# Patient Record
Sex: FEMALE | Race: WHITE | NOT HISPANIC OR LATINO | Employment: STUDENT | URBAN - METROPOLITAN AREA
[De-identification: names, ages, dates, MRNs, and addresses within clinical notes are randomized per-mention and may not be internally consistent; named-entity substitution may affect disease eponyms.]

---
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
## 2019-04-24 ENCOUNTER — OFFICE VISIT (OUTPATIENT)
Dept: OBGYN CLINIC | Facility: MEDICAL CENTER | Age: 19
End: 2019-04-24
Payer: COMMERCIAL

## 2019-04-24 VITALS
DIASTOLIC BLOOD PRESSURE: 68 MMHG | WEIGHT: 189.9 LBS | HEIGHT: 67 IN | BODY MASS INDEX: 29.81 KG/M2 | SYSTOLIC BLOOD PRESSURE: 110 MMHG

## 2019-04-24 DIAGNOSIS — Z30.011 VISIT FOR ORAL CONTRACEPTIVE PRESCRIPTION: Primary | ICD-10-CM

## 2019-04-24 DIAGNOSIS — Z23 NEED FOR HPV VACCINATION: ICD-10-CM

## 2019-04-24 DIAGNOSIS — Z30.011 ENCOUNTER FOR BCP (BIRTH CONTROL PILLS) INITIAL PRESCRIPTION: ICD-10-CM

## 2019-04-24 PROCEDURE — 99214 OFFICE O/P EST MOD 30 MIN: CPT | Performed by: OBSTETRICS & GYNECOLOGY

## 2019-04-24 PROCEDURE — 96372 THER/PROPH/DIAG INJ SC/IM: CPT | Performed by: OBSTETRICS & GYNECOLOGY

## 2019-04-24 PROCEDURE — 90471 IMMUNIZATION ADMIN: CPT | Performed by: OBSTETRICS & GYNECOLOGY

## 2019-04-24 PROCEDURE — 90651 9VHPV VACCINE 2/3 DOSE IM: CPT | Performed by: OBSTETRICS & GYNECOLOGY

## 2019-04-24 RX ORDER — NORGESTIMATE AND ETHINYL ESTRADIOL 0.25-0.035
1 KIT ORAL DAILY
Qty: 30 TABLET | Refills: 11 | Status: SHIPPED | OUTPATIENT
Start: 2019-04-24 | End: 2020-04-26

## 2019-04-24 RX ORDER — LORATADINE 10 MG/1
TABLET ORAL DAILY
COMMUNITY
End: 2021-02-08 | Stop reason: ALTCHOICE

## 2019-05-20 ENCOUNTER — OFFICE VISIT (OUTPATIENT)
Dept: OBGYN CLINIC | Facility: MEDICAL CENTER | Age: 19
End: 2019-05-20
Payer: COMMERCIAL

## 2019-05-20 VITALS — WEIGHT: 187.2 LBS | DIASTOLIC BLOOD PRESSURE: 60 MMHG | BODY MASS INDEX: 29.32 KG/M2 | SYSTOLIC BLOOD PRESSURE: 104 MMHG

## 2019-05-20 DIAGNOSIS — N64.4 PAIN OF BOTH BREASTS: Primary | ICD-10-CM

## 2019-05-20 PROCEDURE — 99214 OFFICE O/P EST MOD 30 MIN: CPT | Performed by: NURSE PRACTITIONER

## 2019-07-26 ENCOUNTER — DOCTOR'S OFFICE (OUTPATIENT)
Dept: URBAN - METROPOLITAN AREA CLINIC 136 | Facility: CLINIC | Age: 19
Setting detail: OPHTHALMOLOGY
End: 2019-07-26
Payer: COMMERCIAL

## 2019-07-26 DIAGNOSIS — H10.413: ICD-10-CM

## 2019-07-26 PROCEDURE — 99202 OFFICE O/P NEW SF 15 MIN: CPT | Performed by: OPTOMETRIST

## 2019-07-26 ASSESSMENT — REFRACTION_MANIFEST
OD_SPHERE: PLANO
OS_VA1: 20/
OS_VA1: 20/20
OS_VA2: 20/
OU_VA: 20/20
OD_VA1: 20/
OU_VA: 20/
OS_VA3: 20/
OS_VA2: 20/
OD_VA3: 20/
OD_VA1: 20/20
OD_CYLINDER: SPH
OD_VA3: 20/
OD_VA2: 20/
OS_CYLINDER: SPH
OS_SPHERE: PLANO
OD_VA2: 20/
OS_VA3: 20/

## 2019-07-26 ASSESSMENT — REFRACTION_CURRENTRX
OS_OVR_VA: 20/
OS_OVR_VA: 20/
OD_OVR_VA: 20/
OS_OVR_VA: 20/

## 2019-07-26 ASSESSMENT — KERATOMETRY
OS_AXISANGLE_DEGREES: 96
OD_K1POWER_DIOPTERS: 44.50
OD_K2POWER_DIOPTERS: 46.25
OS_K2POWER_DIOPTERS: 46.25
OS_K1POWER_DIOPTERS: 44.75
OD_AXISANGLE_DEGREES: 91

## 2019-07-26 ASSESSMENT — REFRACTION_AUTOREFRACTION
OD_SPHERE: +0.50
OD_AXIS: 159
OS_CYLINDER: -0.25
OS_AXIS: 84
OD_CYLINDER: -0.75
OS_SPHERE: PLANO

## 2019-07-26 ASSESSMENT — CONFRONTATIONAL VISUAL FIELD TEST (CVF)
OD_FINDINGS: FULL
OS_FINDINGS: FULL

## 2019-07-26 ASSESSMENT — SPHEQUIV_DERIVED: OD_SPHEQUIV: 0.125

## 2019-07-26 ASSESSMENT — VISUAL ACUITY
OS_BCVA: 20/20-1
OD_BCVA: 20/20

## 2019-07-26 ASSESSMENT — AXIALLENGTH_DERIVED: OD_AL: 22.8769

## 2019-09-25 ENCOUNTER — OFFICE VISIT (OUTPATIENT)
Dept: OBGYN CLINIC | Facility: MEDICAL CENTER | Age: 19
End: 2019-09-25
Payer: COMMERCIAL

## 2019-09-25 VITALS — DIASTOLIC BLOOD PRESSURE: 74 MMHG | SYSTOLIC BLOOD PRESSURE: 102 MMHG | WEIGHT: 182.9 LBS | BODY MASS INDEX: 28.65 KG/M2

## 2019-09-25 DIAGNOSIS — B37.3 CANDIDIASIS OF FEMALE GENITALIA: Primary | ICD-10-CM

## 2019-09-25 DIAGNOSIS — N89.8 VAGINAL ITCHING: ICD-10-CM

## 2019-09-25 PROCEDURE — 99213 OFFICE O/P EST LOW 20 MIN: CPT | Performed by: NURSE PRACTITIONER

## 2019-09-25 RX ORDER — FLUCONAZOLE 200 MG/1
TABLET ORAL
Qty: 2 TABLET | Refills: 1 | Status: SHIPPED | OUTPATIENT
Start: 2019-09-25 | End: 2019-09-29

## 2019-09-25 NOTE — PROGRESS NOTES
A/P:  22 y o  yo female with:  Diagnoses and all orders for this visit:    Candidiasis of female genitalia  -     fluconazole (DIFLUCAN) 200 mg tablet; Take one tablet now and repeat in 4 days if needed  Vaginal itching        1  Wet prep was obtained  Cultures for gonorrhea and chlamydia were not collected  Affirm was not obtained  2   Will contact pt with results  3  Patient was treated with fluconazole  HISTORY OF PRESENT ILLNESS:  Ms Aiyana Mohamud is a 22 y o  yo  female who presents for vaginal discharge  She describes the discharge as thick, white and non-fishy odor  Her symptoms started 3 days ago  show no change   States sxs not related to sex  Alleviating factors: none  Aggravating factors: scratching    ROS:   She denies hematuria, dysuria, constipation, diarrhea, fever, chills, nausea or emesis  No past medical history on file  No past medical history on file      Social History     Socioeconomic History    Marital status: Single     Spouse name: Not on file    Number of children: Not on file    Years of education: Not on file    Highest education level: Not on file   Occupational History    Not on file   Social Needs    Financial resource strain: Not on file    Food insecurity:     Worry: Not on file     Inability: Not on file    Transportation needs:     Medical: Not on file     Non-medical: Not on file   Tobacco Use    Smoking status: Never Smoker    Smokeless tobacco: Never Used   Substance and Sexual Activity    Alcohol use: No    Drug use: No    Sexual activity: Yes     Partners: Male     Birth control/protection: OCP   Lifestyle    Physical activity:     Days per week: Not on file     Minutes per session: Not on file    Stress: Not on file   Relationships    Social connections:     Talks on phone: Not on file     Gets together: Not on file     Attends [employer] service: Not on file     Active member of club or organization: Not on file Attends meetings of clubs or organizations: Not on file     Relationship status: Not on file    Intimate partner violence:     Fear of current or ex partner: Not on file     Emotionally abused: Not on file     Physically abused: Not on file     Forced sexual activity: Not on file   Other Topics Concern    Not on file   Social History Narrative    Not on file       /74   Wt 83 kg (182 lb 14 4 oz)   BMI 28 65 kg/m²     GEN: The patient was alert and oriented x3, pleasant well-appearing female in no acute distress  Pelvic: Normal appearing external female genitalia, normal vaginal epithelium, normalappearing cervix  positive discharge noted        Wet Prep: positive hyphae

## 2019-10-03 ENCOUNTER — TELEPHONE (OUTPATIENT)
Dept: OBGYN CLINIC | Facility: MEDICAL CENTER | Age: 19
End: 2019-10-03

## 2019-10-03 DIAGNOSIS — B96.89 BV (BACTERIAL VAGINOSIS): Primary | ICD-10-CM

## 2019-10-03 DIAGNOSIS — N76.0 BV (BACTERIAL VAGINOSIS): Primary | ICD-10-CM

## 2019-10-03 RX ORDER — METRONIDAZOLE 500 MG/1
500 TABLET ORAL EVERY 12 HOURS SCHEDULED
Qty: 14 TABLET | Refills: 0 | Status: SHIPPED | OUTPATIENT
Start: 2019-10-03 | End: 2019-10-10

## 2019-10-03 NOTE — TELEPHONE ENCOUNTER
Pt was recently seen by Zo Aleman and treated for a yeast infection  She is scheduled on 10/11 for a follow up because she feels that the infection has gotten worse  She would like to know if something stronger could be called in prior to her coming in because she is in discomfort  Thank you!

## 2019-10-09 ENCOUNTER — OFFICE VISIT (OUTPATIENT)
Dept: OBGYN CLINIC | Facility: MEDICAL CENTER | Age: 19
End: 2019-10-09
Payer: COMMERCIAL

## 2019-10-09 VITALS — WEIGHT: 184 LBS | SYSTOLIC BLOOD PRESSURE: 110 MMHG | DIASTOLIC BLOOD PRESSURE: 64 MMHG | BODY MASS INDEX: 28.82 KG/M2

## 2019-10-09 DIAGNOSIS — R39.9 UTI SYMPTOMS: Primary | ICD-10-CM

## 2019-10-09 DIAGNOSIS — Z11.3 SCREENING EXAMINATION FOR STD (SEXUALLY TRANSMITTED DISEASE): ICD-10-CM

## 2019-10-09 DIAGNOSIS — N89.8 VAGINAL DISCHARGE: ICD-10-CM

## 2019-10-09 DIAGNOSIS — N90.89 VULVAR LESION: ICD-10-CM

## 2019-10-09 LAB
SL AMB  POCT GLUCOSE, UA: NORMAL
SL AMB LEUKOCYTE ESTERASE,UA: NORMAL
SL AMB POCT BILIRUBIN,UA: 2
SL AMB POCT BLOOD,UA: NORMAL
SL AMB POCT CLARITY,UA: NORMAL
SL AMB POCT COLOR,UA: YELLOW
SL AMB POCT KETONES,UA: NORMAL
SL AMB POCT NITRITE,UA: NEGATIVE
SL AMB POCT PH,UA: 6.5
SL AMB POCT SPECIFIC GRAVITY,UA: 1.02
SL AMB POCT URINE PROTEIN: NORMAL
SL AMB POCT UROBILINOGEN: 0.2

## 2019-10-09 PROCEDURE — 99214 OFFICE O/P EST MOD 30 MIN: CPT | Performed by: NURSE PRACTITIONER

## 2019-10-09 PROCEDURE — 81002 URINALYSIS NONAUTO W/O SCOPE: CPT | Performed by: NURSE PRACTITIONER

## 2019-10-09 NOTE — PROGRESS NOTES
Assessment Diagnoses and all orders for this visit:    UTI symptoms  -     Urine culture  -     POCT urine dip    Screening examination for STD (sexually transmitted disease)  -     Chlamydia/GC amplified DNA by PCR    Vulvar lesion  -     Herpes Simplex Virus Culture with Typing    Vaginal discharge  -     VAGINOSIS DNA PROBE (AFFIRM)         Plan: Will await results of testing and call her with results and plan  Advised no sex until results are back  Counseled on importance of safe sex with condom use all the time  25 minutes was spent with pt of which >50% was counseling and coordination of care  Vimal Kennedy is a 22 y o  female here for a problem visit  Seen by me recently with same c/o 25 minutes was spent with pt of which >50% was counseling and coordination of care  Patient is complaining of vaginal itching   Pt Felt like sxs resolved after the 2nd dose of fluconazole but returned after she had sex again  Sx's started 1 weeks ago  Patient reports that sx's are not related to her menses     In monogamous relationship with partner of 2 months  Wants piece of mind std testing, asymptomatic  Denies fever, chills, nausea /vomiting  Last night had some pain with urination  No hx of pyelonephritis  There is no problem list on file for this patient  Gynecologic History  Patient's last menstrual period was 09/19/2019  The current method of family planning is OCP (estrogen/progesterone)  No past medical history on file  No past surgical history on file    Family History   Problem Relation Age of Onset    No Known Problems Mother     Ovarian cancer Family     No Known Problems Father      Social History     Socioeconomic History    Marital status: Single     Spouse name: Not on file    Number of children: Not on file    Years of education: Not on file    Highest education level: Not on file   Occupational History    Not on file   Social Needs    Financial resource strain: Not on file    Food insecurity:     Worry: Not on file     Inability: Not on file    Transportation needs:     Medical: Not on file     Non-medical: Not on file   Tobacco Use    Smoking status: Never Smoker    Smokeless tobacco: Never Used   Substance and Sexual Activity    Alcohol use: No    Drug use: No    Sexual activity: Yes     Partners: Male     Birth control/protection: OCP   Lifestyle    Physical activity:     Days per week: Not on file     Minutes per session: Not on file    Stress: Not on file   Relationships    Social connections:     Talks on phone: Not on file     Gets together: Not on file     Attends Buddhist service: Not on file     Active member of club or organization: Not on file     Attends meetings of clubs or organizations: Not on file     Relationship status: Not on file    Intimate partner violence:     Fear of current or ex partner: Not on file     Emotionally abused: Not on file     Physically abused: Not on file     Forced sexual activity: Not on file   Other Topics Concern    Not on file   Social History Narrative    Not on file     Allergies   Allergen Reactions    Cephalosporins Hives    Erythromycin Base Hives    Amoxicillin Hives and Rash    Erythromycin Rash    Sulfamethoxazole-Trimethoprim Rash       Current Outpatient Medications:     loratadine (CLARITIN) 10 mg tablet, Daily , Disp: , Rfl:     metroNIDAZOLE (FLAGYL) 500 mg tablet, Take 1 tablet (500 mg total) by mouth every 12 (twelve) hours for 7 days, Disp: 14 tablet, Rfl: 0    norgestimate-ethinyl estradiol (ORTHO-CYCLEN) 0 25-35 MG-MCG per tablet, Take 1 tablet by mouth daily, Disp: 30 tablet, Rfl: 11    Review of Systems  Constitutional :no fever, feels well, no tiredness, no recent weight gain or loss  ENT: no ear ache, no loss of hearing, no nosebleeds or nasal discharge, no sore throat or hoarseness    Cardiovascular: no complaints of slow or fast heart beat, no chest pain, no palpitations, no leg claudication or lower extremity edema  Respiratory: no complaints of shortness of shortness of breath, no HADDAD  Breasts:no complaints of breast pain, breast lump, or nipple discharge  Gastrointestinal: no complaints of abdominal pain, constipation, nausea, vomiting, or diarrhea or bloody stools  Genitourinary : no complaints of dysuria, incontinence, pelvic pain, no dysmenorrhea, vaginal discharge or abnormal vaginal bleeding and as noted in HPI  Musculoskeletal: no complaints of arthralgia, no myalgia, no joint swelling or stiffness, no limb pain or swelling  Integumentary: no complaints of skin rash or lesion, itching or dry skin  Neurological: no complaints of headache, no confusion, no numbness or tingling, no dizziness or fainting     Objective     /64   Wt 83 5 kg (184 lb)   LMP 09/19/2019   BMI 28 82 kg/m²     General:   appears stated age, cooperative, alert normal mood and affect   Vulva: normal   Vagina: Small ulcerated lesion at introitus, culture sent for HSV  small amount of white d/c in vagina   Urethra: normal   Lymphatic palpation of lymph nodes in neck, axilla, groin and/or other locations: no lymphadenopathy or masses noted   Skin normal skin turgor and no rashes     Psychiatric orientation to person, place, and time: normal  mood and affect: normal

## 2019-10-10 LAB
C TRACH RRNA SPEC QL NAA+PROBE: NOT DETECTED
N GONORRHOEA RRNA SPEC QL NAA+PROBE: NOT DETECTED

## 2019-10-14 NOTE — RESULT ENCOUNTER NOTE
Pl advise normal lab results  Notify that herpes culture was neg, all her other testing was neg  Jessica called her with those results

## 2019-12-03 ENCOUNTER — OFFICE VISIT (OUTPATIENT)
Dept: OBGYN CLINIC | Facility: MEDICAL CENTER | Age: 19
End: 2019-12-03
Payer: COMMERCIAL

## 2019-12-03 VITALS — DIASTOLIC BLOOD PRESSURE: 70 MMHG | BODY MASS INDEX: 28.98 KG/M2 | WEIGHT: 185 LBS | SYSTOLIC BLOOD PRESSURE: 104 MMHG

## 2019-12-03 DIAGNOSIS — R30.0 BURNING WITH URINATION: ICD-10-CM

## 2019-12-03 DIAGNOSIS — N94.9 VAGINAL BURNING: Primary | ICD-10-CM

## 2019-12-03 LAB
SL AMB  POCT GLUCOSE, UA: NORMAL
SL AMB LEUKOCYTE ESTERASE,UA: NORMAL
SL AMB POCT BILIRUBIN,UA: NORMAL
SL AMB POCT BLOOD,UA: NORMAL
SL AMB POCT CLARITY,UA: NORMAL
SL AMB POCT COLOR,UA: YELLOW
SL AMB POCT KETONES,UA: NORMAL
SL AMB POCT NITRITE,UA: NORMAL
SL AMB POCT PH,UA: 5
SL AMB POCT SPECIFIC GRAVITY,UA: 1.03
SL AMB POCT URINE PROTEIN: NORMAL
SL AMB POCT UROBILINOGEN: 0.2

## 2019-12-03 PROCEDURE — 81002 URINALYSIS NONAUTO W/O SCOPE: CPT | Performed by: NURSE PRACTITIONER

## 2019-12-03 PROCEDURE — 99214 OFFICE O/P EST MOD 30 MIN: CPT | Performed by: NURSE PRACTITIONER

## 2019-12-03 RX ORDER — FLUCONAZOLE 200 MG/1
TABLET ORAL
Qty: 2 TABLET | Refills: 0 | Status: SHIPPED | OUTPATIENT
Start: 2019-12-03 | End: 2019-12-07

## 2019-12-03 NOTE — PROGRESS NOTES
A/P:  20 y o  yo female with:  Diagnoses and all orders for this visit:    Vaginal burning  -     fluconazole (DIFLUCAN) 200 mg tablet; Take one tablet now and repeat in 4 days if needed  Burning with urination  -     POCT urine dip  -     Urine culture        1  Wet prep was obtained  Cultures for gonorrhea and chlamydia were not collected  Affirm was not obtained  2   Will contact pt with results  3  Patient was treated with fluconazole  4  Urine dip in office was neg, cc culture sent to lab  5  Safe sex practices and vaginal hygeine d/w her and all her questions were answered  6 25 minutes was spent with pt of which >50% was counseling and coordination of care  HISTORY OF PRESENT ILLNESS:  Ms Aiyana Bustos is a 20 y o  yo [place] female who presents for vaginal discharge and irritation     She describes the discharge as white  Her symptoms started 1 weeks ago  show no change  States in monog  Relationship for 2 months, always uses condoms  Has occa  Abd  Pain  Denies f/c/n/v  No dyspareunia  Alleviating factors: none  Aggravating factors: burns when urine hits the skin    ROS:   She denies hematuria, dysuria, constipation, diarrhea, fever, chills, nausea or emesis  No past medical history on file  No past medical history on file      Social History     Socioeconomic History    Marital status: Single     Spouse name: Not on file    Number of children: Not on file    Years of education: Not on file    Highest education level: Not on file   Occupational History    Not on file   Social Needs    Financial resource strain: Not on file    Food insecurity:     Worry: Not on file     Inability: Not on file    Transportation needs:     Medical: Not on file     Non-medical: Not on file   Tobacco Use    Smoking status: Never Smoker    Smokeless tobacco: Never Used   Substance and Sexual Activity    Alcohol use: No    Drug use: No    Sexual activity: Yes     Partners: Male     Birth control/protection: OCP   Lifestyle    Physical activity:     Days per week: Not on file     Minutes per session: Not on file    Stress: Not on file   Relationships    Social connections:     Talks on phone: Not on file     Gets together: Not on file     Attends Quaker service: Not on file     Active member of club or organization: Not on file     Attends meetings of clubs or organizations: Not on file     Relationship status: Not on file    Intimate partner violence:     Fear of current or ex partner: Not on file     Emotionally abused: Not on file     Physically abused: Not on file     Forced sexual activity: Not on file   Other Topics Concern    Not on file   Social History Narrative    Not on file       /70   Wt 83 9 kg (185 lb)   LMP 11/09/2019   BMI 28 98 kg/m²     GEN: The patient was alert and oriented x3, pleasant well-appearing female in no acute distress  Pelvic: Normal appearing external female genitalia, normal vaginal epithelium, normalappearing cervix  positive discharge noted        Wet Prep: positive hyphae

## 2019-12-17 ENCOUNTER — TELEPHONE (OUTPATIENT)
Dept: OBGYN CLINIC | Facility: MEDICAL CENTER | Age: 19
End: 2019-12-17

## 2019-12-17 DIAGNOSIS — N94.9 VAGINAL BURNING: Primary | ICD-10-CM

## 2019-12-17 RX ORDER — FLUCONAZOLE 200 MG/1
TABLET ORAL
Qty: 2 TABLET | Refills: 0 | Status: SHIPPED | OUTPATIENT
Start: 2019-12-17 | End: 2019-12-21

## 2019-12-17 NOTE — TELEPHONE ENCOUNTER
Treated 2 weeks ago with diflucan  Had improvement now symptoms have returned  Requesting treatment again  Describing vaginal itching and burning with thick white discharge    Advised patient that if infection re-occurs, she would need to schedule appointment

## 2019-12-17 NOTE — TELEPHONE ENCOUNTER
The patient left a message that the patient was seen about a week ago  The patient still has a yeast infection  Can something stronger be called into the patients pharmacy

## 2020-01-28 DIAGNOSIS — J11.1 INFLUENZA: ICD-10-CM

## 2020-01-28 DIAGNOSIS — A60.00 PRIMARY GENITAL HERPES SIMPLEX INFECTION: Primary | ICD-10-CM

## 2020-01-28 RX ORDER — OSELTAMIVIR PHOSPHATE 75 MG/1
75 CAPSULE ORAL 2 TIMES DAILY
Qty: 10 CAPSULE | Refills: 0 | Status: SHIPPED | OUTPATIENT
Start: 2020-01-28 | End: 2020-02-02

## 2020-01-28 NOTE — PROGRESS NOTES
Call placed to patient regarding appointment today  Patient has appointment scheduled for STD testing and pregnancy test, but seen in [address] [place] [address] [address] 1/27/20 for same  Also flu+  Mother also on phone for call  Reviewed results with patient; has not been informed of any results  Reviewed first that she is influenza A+  She notes symptoms started just under 2d ago; advised on tamiflu, which she accepts  Advised patient on +HSV1 swab  Patient notes she has only recently been sexually active with 2 partners, and "they're both negative " Advised on methods for testing HSV - swab vs serum  Swab will only be + with active outbreak; serum will remain +  Advised patient on valtrex therapy for acute outbreak, natural hx of HSV, lifelong nature of infection, and that she is capable of transmitting to others while in prodrome or with active infection  Patient and her mother had many questions regarding how to determine where she got this; I advised on the limitations of this search, as this is an infection that can lie dormant for a long time and she may have acquired it during the prodrome period of a partner and not noticed any lesions  I advised on not using suppression right now  Advised to determine her outbreak pattern, and often times this is infrequent and may be managed with outbreak directed therapy  If frequent, can discuss daily medication  Advised GC still pending; asked to call office Thurs/Fri for results, as they will come to her ordering provider and not our direct results  Advised on recommendation to screen for HIV/Hep B&C/RPR, which she can do at any [facility] when well  Slips placed in Epic  Patient and her mother had all questions answered to their satisfaction  Appointment cancelled and advised to reschedule if still requiring evaluation later      Suly Laws MD  01/28/20  10:50 AM

## 2020-02-04 ENCOUNTER — TELEPHONE (OUTPATIENT)
Dept: OBGYN CLINIC | Facility: MEDICAL CENTER | Age: 20
End: 2020-02-04

## 2020-02-04 NOTE — TELEPHONE ENCOUNTER
Pt has questions in regards on medication treatment she was put on  She stated she was put doxycycline and other ones she didn't know the name of  States there is changes and would like to discuss with you

## 2020-02-04 NOTE — TELEPHONE ENCOUNTER
Call returned to patient  She reports she finished Valtrex and sores have resolved, however she has a new itching on her clitoris  Feels swollen, but doesn't have same pain or ulceration as her other sores  Discussed difficult to say if this is a herpes lesion or not; given other resolution, likely just irritated from recent infection  Advised barrier ointment with vaseline and symptomatic care  Also advised patient GC testing with [facility] seems to not have been run  Advised her to call [facility] to inquire about this  If not completed, we can do the testing for her at her next visit  She notes she wants to schedule an appointment for follow up  Directed to  staff for scheduling

## 2020-02-11 ENCOUNTER — OFFICE VISIT (OUTPATIENT)
Dept: OBGYN CLINIC | Facility: MEDICAL CENTER | Age: 20
End: 2020-02-11
Payer: COMMERCIAL

## 2020-02-11 VITALS
SYSTOLIC BLOOD PRESSURE: 100 MMHG | HEIGHT: 66 IN | BODY MASS INDEX: 29.25 KG/M2 | DIASTOLIC BLOOD PRESSURE: 70 MMHG | WEIGHT: 182 LBS

## 2020-02-11 DIAGNOSIS — F32.0 CURRENT MILD EPISODE OF MAJOR DEPRESSIVE DISORDER, UNSPECIFIED WHETHER RECURRENT (HCC): ICD-10-CM

## 2020-02-11 DIAGNOSIS — A60.04 HERPES SIMPLEX VULVOVAGINITIS: Primary | ICD-10-CM

## 2020-02-11 PROCEDURE — 99214 OFFICE O/P EST MOD 30 MIN: CPT | Performed by: OBSTETRICS & GYNECOLOGY

## 2020-02-11 RX ORDER — SERTRALINE HYDROCHLORIDE 25 MG/1
25 TABLET, FILM COATED ORAL DAILY
Qty: 30 TABLET | Refills: 6 | Status: SHIPPED | OUTPATIENT
Start: 2020-02-11 | End: 2021-02-08 | Stop reason: ALTCHOICE

## 2020-02-11 RX ORDER — VALACYCLOVIR HYDROCHLORIDE 500 MG/1
500 TABLET, FILM COATED ORAL DAILY
Qty: 30 TABLET | Refills: 11 | Status: SHIPPED | OUTPATIENT
Start: 2020-02-11 | End: 2020-03-09 | Stop reason: SDUPTHER

## 2020-02-12 LAB
HBV SURFACE AG SERPL QL IA: NORMAL
HCV AB S/CO SERPL IA: 0.03
HCV AB SERPL QL IA: NORMAL
HIV 1+2 AB+HIV1 P24 AG SERPL QL IA: NORMAL
RPR SER QL: NORMAL

## 2020-02-28 ENCOUNTER — TELEPHONE (OUTPATIENT)
Dept: OBGYN CLINIC | Facility: MEDICAL CENTER | Age: 20
End: 2020-02-28

## 2020-02-28 NOTE — TELEPHONE ENCOUNTER
The patient has questions about the birth control that the patient was put on and would like a call back at [phone]

## 2020-03-02 ENCOUNTER — TELEPHONE (OUTPATIENT)
Dept: OBGYN CLINIC | Facility: MEDICAL CENTER | Age: 20
End: 2020-03-02

## 2020-03-02 NOTE — TELEPHONE ENCOUNTER
Pt had spotting 10 days before period  States she never spots before her period  Pt recently started taking valtrex  Pt wants to know if spotting is a side affect of the medication

## 2020-03-09 DIAGNOSIS — A60.04 HERPES SIMPLEX VULVOVAGINITIS: ICD-10-CM

## 2020-03-09 RX ORDER — VALACYCLOVIR HYDROCHLORIDE 500 MG/1
500 TABLET, FILM COATED ORAL DAILY
Qty: 30 TABLET | Refills: 11 | Status: SHIPPED | OUTPATIENT
Start: 2020-03-09 | End: 2020-07-07 | Stop reason: SDUPTHER

## 2020-04-26 DIAGNOSIS — Z30.011 ENCOUNTER FOR BCP (BIRTH CONTROL PILLS) INITIAL PRESCRIPTION: ICD-10-CM

## 2020-05-14 ENCOUNTER — TELEPHONE (OUTPATIENT)
Dept: OBGYN CLINIC | Facility: MEDICAL CENTER | Age: 20
End: 2020-05-14

## 2020-07-07 DIAGNOSIS — A60.04 HERPES SIMPLEX VULVOVAGINITIS: ICD-10-CM

## 2020-07-08 RX ORDER — VALACYCLOVIR HYDROCHLORIDE 500 MG/1
500 TABLET, FILM COATED ORAL DAILY
Qty: 30 TABLET | Refills: 11 | Status: SHIPPED | OUTPATIENT
Start: 2020-07-08 | End: 2021-03-10 | Stop reason: SDUPTHER

## 2020-09-23 ENCOUNTER — OFFICE VISIT (OUTPATIENT)
Dept: OBGYN CLINIC | Facility: MEDICAL CENTER | Age: 20
End: 2020-09-23
Payer: COMMERCIAL

## 2020-09-23 VITALS
TEMPERATURE: 97.1 F | SYSTOLIC BLOOD PRESSURE: 120 MMHG | DIASTOLIC BLOOD PRESSURE: 60 MMHG | BODY MASS INDEX: 24.75 KG/M2 | WEIGHT: 154 LBS | HEIGHT: 66 IN

## 2020-09-23 DIAGNOSIS — N89.8 VAGINAL DISCHARGE: ICD-10-CM

## 2020-09-23 DIAGNOSIS — N90.89 SWELLING OF VULVA: Primary | ICD-10-CM

## 2020-09-23 DIAGNOSIS — Z86.19 HISTORY OF POSITIVE PCR FOR HERPES SIMPLEX VIRUS TYPE 1 (HSV-1) DNA: ICD-10-CM

## 2020-09-23 PROCEDURE — 99214 OFFICE O/P EST MOD 30 MIN: CPT | Performed by: NURSE PRACTITIONER

## 2020-09-23 NOTE — PROGRESS NOTES
A/P:  20 y o  yo female with:  Diagnoses and all orders for this visit:    Swelling of vulva    Vaginal discharge    History of positive PCR for herpes simplex virus type 1 (HSV-1) DNA        1  Wet prep was obtained  Cultures for gonorrhea and chlamydia were not collected  Affirm was not obtained  2   Will contact pt with results  3  Patient was not treated   4 pt reassured of normal exam  Without infection  5 discussed episodic vs suppressive therapy has script, is leaning towards suppression  discussed with her how to tell partner about hsv diagosis  6  rec she stay off sites that are giving her inacurate info and call if has questions  7  25 minutes was spent with pt of which >50 was counseling and coordination of care  HISTORY OF PRESENT ILLNESS:  Ms Aiyana Durbin is a 20 y o  yo  female who presents with numerous concerns  States she started a very low calorie vegan type diet and detox  and lost 40# in 3 months  She was starting to feel week so recently stopped it and is eating regular diet now  States mainly did b/c it was suppose to cure hsv, which she thinks it has, is constantly researching hsv, and thinks she needs an us to evaluate her and also she has not been sexually active since [facility]  Menses are a little irregular now, but declines upt  She wants me to check her vulva swelling , and vaginal d/c  No odor, or itching  describes the discharge as white and clear  Her symptoms started 1 weeks ago  show no change    Alleviating factors: none  Aggravating factors: none  ROS:   She denies hematuria, dysuria, constipation, diarrhea, fever, chills, nausea or emesis  No past medical history on file  No past medical history on file      Social History     Socioeconomic History    Marital status: Single     Spouse name: Not on file    Number of children: Not on file    Years of education: Not on file    Highest education level: Not on file   Occupational History    Not on file   Social Needs    Financial resource strain: Not on file    Food insecurity     Worry: Not on file     Inability: Not on file    Transportation needs     Medical: Not on file     Non-medical: Not on file   Tobacco Use    Smoking status: Never Smoker    Smokeless tobacco: Never Used   Substance and Sexual Activity    Alcohol use: No    Drug use: No    Sexual activity: Not Currently     Partners: Male     Birth control/protection: None   Lifestyle    Physical activity     Days per week: Not on file     Minutes per session: Not on file    Stress: Not on file   Relationships    Social connections     Talks on phone: Not on file     Gets together: Not on file     Attends Latter day service: Not on file     Active member of club or organization: Not on file     Attends meetings of clubs or organizations: Not on file     Relationship status: Not on file    Intimate partner violence     Fear of current or ex partner: Not on file     Emotionally abused: Not on file     Physically abused: Not on file     Forced sexual activity: Not on file   Other Topics Concern    Not on file   Social History Narrative    Not on file       /60   Temp (!) 97 1 °F (36 2 °C) (Temporal)   Ht 5' 6" (1 676 m)   Wt 69 9 kg (154 lb)   LMP 08/26/2020 (Approximate)   BMI 24 86 kg/m²     GEN: The patient was alert and oriented x3, pleasant well-appearing female in no acute distress  Pelvic: Normal appearing external female genitalia, no swelling of vulva or vagina  Pt reassured she does not have a bartholins cyst  , normal vaginal epithelium, normalappearing cervix  Small amount of white discharge noted        Wet Prep: negative for infection

## 2020-10-28 ENCOUNTER — TELEPHONE (OUTPATIENT)
Dept: OBGYN CLINIC | Facility: MEDICAL CENTER | Age: 20
End: 2020-10-28

## 2020-11-18 ENCOUNTER — OFFICE VISIT (OUTPATIENT)
Dept: OBGYN CLINIC | Facility: MEDICAL CENTER | Age: 20
End: 2020-11-18
Payer: COMMERCIAL

## 2020-11-18 VITALS
BODY MASS INDEX: 26.19 KG/M2 | DIASTOLIC BLOOD PRESSURE: 90 MMHG | HEIGHT: 67 IN | TEMPERATURE: 89.4 F | SYSTOLIC BLOOD PRESSURE: 106 MMHG | WEIGHT: 166.9 LBS

## 2020-11-18 DIAGNOSIS — L65.0 TELOGEN EFFLUVIUM: Primary | ICD-10-CM

## 2020-11-18 PROBLEM — B00.9 HSV-1 INFECTION: Status: ACTIVE | Noted: 2020-11-18

## 2020-11-18 PROCEDURE — 99213 OFFICE O/P EST LOW 20 MIN: CPT | Performed by: OBSTETRICS & GYNECOLOGY

## 2020-11-19 LAB
FSH SERPL-ACNC: 5.2 MIU/ML
LH SERPL-ACNC: 6.4 MIU/ML
PROGEST SERPL-MCNC: 2.4 NG/ML
PROLACTIN SERPL-MCNC: 23 NG/ML
TSH SERPL-ACNC: 1.49 MIU/L

## 2021-02-08 ENCOUNTER — OFFICE VISIT (OUTPATIENT)
Dept: OBGYN CLINIC | Facility: MEDICAL CENTER | Age: 21
End: 2021-02-08
Payer: COMMERCIAL

## 2021-02-08 VITALS — SYSTOLIC BLOOD PRESSURE: 110 MMHG | WEIGHT: 172 LBS | DIASTOLIC BLOOD PRESSURE: 70 MMHG | BODY MASS INDEX: 27.35 KG/M2

## 2021-02-08 DIAGNOSIS — N89.8 VAGINAL DISCHARGE: ICD-10-CM

## 2021-02-08 DIAGNOSIS — Z11.3 SCREENING EXAMINATION FOR STD (SEXUALLY TRANSMITTED DISEASE): Primary | ICD-10-CM

## 2021-02-08 PROCEDURE — 99213 OFFICE O/P EST LOW 20 MIN: CPT | Performed by: NURSE PRACTITIONER

## 2021-02-08 NOTE — PROGRESS NOTES
A/P:  23 y o  yo female with:  Diagnoses and all orders for this visit:    Screening examination for STD (sexually transmitted disease)  -     Human Immunodeficiency Virus 1/2 Antigen / Antibody ( Fourth Generation) with Reflex Testing; Future  -     RPR; Future  -     Hepatitis B surface antigen; Future  -     Human Immunodeficiency Virus 1/2 Antigen / Antibody ( Fourth Generation) with Reflex Testing  -     RPR  -     Hepatitis B surface antigen  -     Chlamydia/GC amplified DNA by PCR    Vaginal discharge        1  Wet prep was obtained  Cultures for gonorrhea and chlamydia were collected  Affirm was not obtained  2   Will contact pt with results  3  Patient was not treated   4  Given [facility]  5  Stressed condom use all the the time  Will call if decides  she wants to discuss stating a birth control method        HISTORY OF PRESENT ILLNESS:  Ms Aiyana Washington is a 23 y o  yo  female who presents for vaginal discharge  She describes the discharge as clear  Her symptoms started 2 days ago  show no change  States she is in a new relationship and wants Piece of mind testing  Denies any known exposure  Alleviating factors: none  Aggravating factors: none    ROS:   She denies hematuria, dysuria, constipation, diarrhea, fever, chills, nausea or emesis  No past medical history on file  No past medical history on file      Social History     Socioeconomic History    Marital status: Single     Spouse name: Not on file    Number of children: Not on file    Years of education: Not on file    Highest education level: Not on file   Occupational History    Not on file   Social Needs    Financial resource strain: Not on file    Food insecurity     Worry: Not on file     Inability: Not on file    Transportation needs     Medical: Not on file     Non-medical: Not on file   Tobacco Use    Smoking status: Never Smoker    Smokeless tobacco: Never Used   Substance and Sexual Activity    Alcohol use: No    Drug use: No    Sexual activity: Not Currently     Partners: Male     Birth control/protection: None   Lifestyle    Physical activity     Days per week: Not on file     Minutes per session: Not on file    Stress: Not on file   Relationships    Social connections     Talks on phone: Not on file     Gets together: Not on file     Attends Protestant service: Not on file     Active member of club or organization: Not on file     Attends meetings of clubs or organizations: Not on file     Relationship status: Not on file    Intimate partner violence     Fear of current or ex partner: Not on file     Emotionally abused: Not on file     Physically abused: Not on file     Forced sexual activity: Not on file   Other Topics Concern    Not on file   Social History Narrative    Not on file       /70   Wt 78 kg (172 lb)   LMP 02/01/2021   BMI 27 35 kg/m²     GEN: The patient was alert and oriented x3, pleasant well-appearing female in no acute distress  Pelvic: Normal appearing external female genitalia, normal vaginal epithelium, normalappearing cervix  positive discharge noted        Wet Prep: no pathogens

## 2021-02-09 LAB
C TRACH RRNA SPEC QL NAA+PROBE: NOT DETECTED
HBV SURFACE AG SERPL QL IA: NORMAL
HIV 1+2 AB+HIV1 P24 AG SERPL QL IA: NORMAL
N GONORRHOEA RRNA SPEC QL NAA+PROBE: NOT DETECTED
RPR SER QL: NORMAL

## 2021-02-14 LAB
HSV1 IGG SER IA-ACNC: 2.67 INDEX
HSV1 IGM SER QL IF: NEGATIVE
HSV2 IGG SER IA-ACNC: <0.9 INDEX
HSV2 IGM SER QL IF: NEGATIVE

## 2021-02-16 ENCOUNTER — TELEPHONE (OUTPATIENT)
Dept: OBGYN CLINIC | Facility: MEDICAL CENTER | Age: 21
End: 2021-02-16

## 2021-02-16 DIAGNOSIS — Z30.41 ENCOUNTER FOR SURVEILLANCE OF CONTRACEPTIVE PILLS: Primary | ICD-10-CM

## 2021-02-16 NOTE — TELEPHONE ENCOUNTER
The patient also questioned something else  She was recently diagnosed with Telogen Effluvium and she stated that it has gotten better    She just wants to make sure that the birth control would not aggravate it again

## 2021-02-16 NOTE — TELEPHONE ENCOUNTER
The patient called and stated that she would like to start up her Sprintec again and she also wanted to know if that would be ok for her to take with the Valtrex that she is taking currently

## 2021-02-17 NOTE — TELEPHONE ENCOUNTER
Tell her I spoke with   And they recommend she  should check with her dermatologist if she can use ocp while on it

## 2021-02-17 NOTE — TELEPHONE ENCOUNTER
The rx was pended to [facility] as well as an additional question was sent to her as well from the patient

## 2021-02-22 RX ORDER — NORGESTIMATE AND ETHINYL ESTRADIOL 0.25-0.035
1 KIT ORAL DAILY
Qty: 90 TABLET | Refills: 3 | OUTPATIENT
Start: 2021-02-22

## 2021-03-10 DIAGNOSIS — A60.04 HERPES SIMPLEX VULVOVAGINITIS: ICD-10-CM

## 2021-03-10 RX ORDER — VALACYCLOVIR HYDROCHLORIDE 500 MG/1
500 TABLET, FILM COATED ORAL DAILY
Qty: 30 TABLET | Refills: 11 | Status: SHIPPED | OUTPATIENT
Start: 2021-03-10 | End: 2022-03-24 | Stop reason: SDUPTHER

## 2021-03-10 RX ORDER — NORGESTIMATE AND ETHINYL ESTRADIOL 0.25-0.035
1 KIT ORAL DAILY
Qty: 28 TABLET | Refills: 1 | Status: SHIPPED | OUTPATIENT
Start: 2021-03-10 | End: 2021-05-01

## 2021-03-15 ENCOUNTER — TELEPHONE (OUTPATIENT)
Dept: OBGYN CLINIC | Facility: MEDICAL CENTER | Age: 21
End: 2021-03-15

## 2021-03-15 NOTE — TELEPHONE ENCOUNTER
Pt called and had questions about Shirley birth control  Pt used to use Sprintec but pharmacy prescribed Shirley instead  Please review

## 2021-03-15 NOTE — TELEPHONE ENCOUNTER
As per communication consent, Yordan Faulkner  Advised that [address] is generic equiv   To sprintec

## 2021-03-18 ENCOUNTER — ANNUAL EXAM (OUTPATIENT)
Dept: OBGYN CLINIC | Facility: MEDICAL CENTER | Age: 21
End: 2021-03-18
Payer: COMMERCIAL

## 2021-03-18 VITALS
SYSTOLIC BLOOD PRESSURE: 120 MMHG | BODY MASS INDEX: 27.31 KG/M2 | DIASTOLIC BLOOD PRESSURE: 70 MMHG | HEIGHT: 67 IN | WEIGHT: 174 LBS

## 2021-03-18 DIAGNOSIS — Z01.419 ENCOUNTER FOR GYNECOLOGICAL EXAMINATION WITH PAPANICOLAOU SMEAR OF CERVIX: Primary | ICD-10-CM

## 2021-03-18 PROCEDURE — 0503F POSTPARTUM CARE VISIT: CPT | Performed by: NURSE PRACTITIONER

## 2021-03-18 PROCEDURE — 99395 PREV VISIT EST AGE 18-39: CPT | Performed by: NURSE PRACTITIONER

## 2021-03-18 NOTE — PROGRESS NOTES
ASSESSMENT & PLAN: Adilia Sarah is a 23 y o  Delia Barthel with normal gynecologic exam     1   Routine well woman exam done today  2  Pap:  The patient's last pap was never had one       Pap was done today  Current ASCCP Guidelines reviewed  3   STD testing  was not done   4  Gardasil recommendations reviewed is vaccinated  5  The following were reviewed in today's visit: breast self exam, use and side effects of OCPs, adequate intake of calcium and vitamin D, exercise and healthy diet  6  rto one year    CC:  Annual Gynecologic Examination    HPI: Adilia Sarah is a 23 y o  Delia Barthel who presents for annual gynecologic examination  She has the following concerns:  none    Health Maintenance:    She wears her seatbelt routinely  She does perform regular monthly self breast exams  She feels safe at home  History reviewed  No pertinent past medical history  History reviewed  No pertinent surgical history  OB/Gyn History:    Pt does not have menstrual issues  History of sexually transmitted infection: No   History of abnormal pap smears: No      Patient is currently sexually active  The current method of family planning is OCP (estrogen/progesterone)      OB History        0    Para   0    Term   0       0    AB   0    Living   0       SAB   0    TAB   0    Ectopic   0    Multiple   0    Live Births   0                 Family History   Problem Relation Age of Onset    No Known Problems Mother     Ovarian cancer Family     No Known Problems Father        Social History:  Social History     Socioeconomic History    Marital status: Single     Spouse name: Not on file    Number of children: Not on file    Years of education: Not on file    Highest education level: Not on file   Occupational History    Not on file   Social Needs    Financial resource strain: Not on file    Food insecurity     Worry: Not on file     Inability: Not on file   Causes needs Medical: Not on file     Non-medical: Not on file   Tobacco Use    Smoking status: Never Smoker    Smokeless tobacco: Never Used   Substance and Sexual Activity    Alcohol use: No    Drug use: No    Sexual activity: Yes     Partners: Male     Birth control/protection: Pill   Lifestyle    Physical activity     Days per week: Not on file     Minutes per session: Not on file    Stress: Not on file   Relationships    Social connections     Talks on phone: Not on file     Gets together: Not on file     Attends Zoroastrian service: Not on file     Active member of club or organization: Not on file     Attends meetings of clubs or organizations: Not on file     Relationship status: Not on file    Intimate partner violence     Fear of current or ex partner: Not on file     Emotionally abused: Not on file     Physically abused: Not on file     Forced sexual activity: Not on file   Other Topics Concern    Not on file   Social History Narrative    Not on file     Patient is single  Patient is currently employed     Allergies   Allergen Reactions    Cephalosporins Hives    Erythromycin Base Hives    Amoxicillin Hives and Rash    Erythromycin Rash    Sulfamethoxazole-Trimethoprim Rash         Current Outpatient Medications:     norgestimate-ethinyl estradiol (ORTHO-CYCLEN) 0 25-35 MG-MCG per tablet, Take 1 tablet by mouth daily, Disp: 28 tablet, Rfl: 1    valACYclovir (VALTREX) 500 mg tablet, Take 1 tablet (500 mg total) by mouth daily, Disp: 30 tablet, Rfl: 11    Review of Systems:  Constitutional :no fever, feels well, no tiredness, no recent weight gain or loss  ENT: no ear ache, no loss of hearing, no nosebleeds or nasal discharge, no sore throat or hoarseness  Cardiovascular: no complaints of slow or fast heart beat, no chest pain, no palpitations, no leg claudication or lower extremity edema    Respiratory: no complaints of shortness of shortness of breath, no HADDAD  Breasts:no complaints of breast pain, breast lump, or nipple discharge  Gastrointestinal: no complaints of abdominal pain, constipation, nausea, vomiting, or diarrhea or bloody stools  Genitourinary : no complaints of dysuria, incontinence, pelvic pain, no dysmenorrhea, vaginal discharge or abnormal vaginal bleeding and as noted in HPI  Musculoskeletal: no complaints of arthralgia, no myalgia, no joint swelling or stiffness, no limb pain or swelling  Integumentary: no complaints of skin rash or lesion, itching or dry skin  Neurological: no complaints of headache, no confusion, no numbness or tingling, no dizziness or fainting    Objective      /70   Ht 5' 6 5" (1 689 m)   Wt 78 9 kg (174 lb)   LMP 03/01/2021 (Approximate)   BMI 27 66 kg/m²     General:   appears stated age, cooperative, alert normal mood and affect   Neck: normal, supple,trachea midline, no masses   Heart: regular rate and rhythm, S1, S2 normal, no murmur, click, rub or gallop   Lungs: clear to auscultation bilaterally   Breasts: normal appearance, no masses or tenderness   Abdomen: soft, non-tender, without masses or organomegaly   Vulva: normal   Vagina: normal vagina   Urethra: normal   Cervix: Normal, no discharge  Uterus: normal size, contour, position, consistency, mobility, non-tender   Adnexa: no mass, fullness, tenderness   Lymphatic palpation of lymph nodes in neck, axilla, groin and/or other locations: no lymphadenopathy or masses noted   Skin normal skin turgor and no rashes     Psychiatric orientation to person, place, and time: normal  mood and affect: normal

## 2021-03-24 LAB
CLINICAL INFO: ABNORMAL
CYTO CVX: ABNORMAL
CYTOLOGY CMNT CVX/VAG CYTO-IMP: ABNORMAL
DATE PREVIOUS BX: ABNORMAL
GEN CATEG CVX/VAG CYTO-IMP: ABNORMAL
HPV E6+E7 MRNA CVX QL NAA+PROBE: DETECTED
LMP START DATE: ABNORMAL
SL AMB PREV. PAP:: ABNORMAL
SPECIMEN SOURCE CVX/VAG CYTO: ABNORMAL

## 2021-03-25 ENCOUNTER — TELEPHONE (OUTPATIENT)
Dept: OBGYN CLINIC | Facility: MEDICAL CENTER | Age: 21
End: 2021-03-25

## 2021-03-25 NOTE — TELEPHONE ENCOUNTER
Pt is concerned her pap came back positive for hpv and takes valtrex and birth control and is worried it will prevent it from clearing up  Please review and call pt

## 2021-04-01 ENCOUNTER — OFFICE VISIT (OUTPATIENT)
Dept: OBGYN CLINIC | Facility: MEDICAL CENTER | Age: 21
End: 2021-04-01
Payer: COMMERCIAL

## 2021-04-01 VITALS
WEIGHT: 173.7 LBS | HEIGHT: 67 IN | DIASTOLIC BLOOD PRESSURE: 70 MMHG | SYSTOLIC BLOOD PRESSURE: 108 MMHG | BODY MASS INDEX: 27.26 KG/M2

## 2021-04-01 DIAGNOSIS — R87.810 ASCUS WITH POSITIVE HIGH RISK HPV CERVICAL: Primary | ICD-10-CM

## 2021-04-01 DIAGNOSIS — R87.610 ASCUS WITH POSITIVE HIGH RISK HPV CERVICAL: Primary | ICD-10-CM

## 2021-04-01 PROCEDURE — 99213 OFFICE O/P EST LOW 20 MIN: CPT | Performed by: ADVANCED PRACTICE MIDWIFE

## 2021-04-01 NOTE — PATIENT INSTRUCTIONS
Human papillomavirus DNA detection   GENERAL INFORMATION:  What is this test?  This test detects DNA gene sequences of a type of virus called human papilloma virus (HPV) in cervical cells  It is used to detect potentially cancerous forms of this virus  What are other names for this test?  · HPV DNA detection  · Human papillomavirus deoxyribonucleic acid detection  What are related tests? · Cervical cytology test  · Colposcopy of cervix  · Sampling of cervix for Papanicolaou smear  Why do I need this test?  Laboratory tests may be done for many reasons  Tests are performed for routine health screenings or if a disease or toxicity is suspected  Lab tests may be used to determine if a medical condition is improving or worsening  Lab tests may also be used to measure the success or failure of a medication or treatment plan  Lab tests may be ordered for professional or legal reasons  You may need this test if you have:   · Atypical squamous cells  · Screening for malignant neoplasm of cervix  When and how often should I have this test?  When and how often laboratory tests are done may depend on many factors  The timing of laboratory tests may rely on the results or completion of other tests, procedures, or treatments  Lab tests may be performed immediately in an emergency, or tests may be delayed as a condition is treated or monitored  A test may be suggested or become necessary when certain signs or symptoms appear  Due to changes in the way your body naturally functions through the course of a day, lab tests may need to be performed at a certain time of day  If you have prepared for a test by changing your food or fluid intake, lab tests may be timed in accordance with those changes  Timing of tests may be based on increased and decreased levels of medications, drugs or other substances in the body  The age or gender of the person being tested may affect when and how often a lab test is required   Chronic or progressive conditions may need ongoing monitoring through the use of lab tests  Conditions that worsen and improve may also need frequent monitoring  Certain tests may be repeated to obtain a series of results, or tests may need to be repeated to confirm or disprove results  Timing and frequency of lab tests may vary if they are performed for professional or legal reasons  How should I get ready for the test?  Written consent will be required for endocervical sampling  Review the consent form with the healthcare worker and ask any questions that you have before signing the consent form  Tell the healthcare worker if you have a medical condition or are using a medication or supplement that causes excessive bleeding  If possible, schedule the procedure one week after your menstrual period  Do not douche or have sexual intercourse for 24 hours before the procedure  You may be asked to urinate prior to your endocervical sampling  This will make it easier for the healthcare worker to see your cervical canal during the procedure and may make the procedure more comfortable for you  How is the test done? For an endocervical sampling, you will be asked to lie on your back with your legs spread and feet placed in stirrups  A speculum will be inserted into your vagina  This tool is used to gently spread apart your vagina  A small brush is inserted into the endocervical canal and rotated  This is done to collect cells  Once a sufficient sample is collected, the brush is removed  The sample off the brush is then sent for testing  How will the test feel? The amount of discomfort you feel will depend on many factors, including your sensitivity to pain  Communicate how you are feeling with the person doing the procedure  Inform the person doing the procedure if you feel that you cannot continue with the procedure  You may feel mild discomfort, cramping, or pain during an endocervical sampling    What should I do after the test?  After endocervical sampling, you may experience some light spotting (mild, occasional bleeding from the vagina)  Generally, there are no activity restrictions after this procedure  What are the risks? Endocervical cells: An endocervical cell sample is collected using a method similar to a Pap smear  This procedure may cause light bleeding from the vagina  If you have a medical condition, or are using a medication or supplement that causes excessive bleeding, you are at a higher risk of bleeding during or after this procedure  The person doing this procedure may need to perform it more than once  Talk to your healthcare worker if you have any concerns about the risks of this procedure  What are normal results for this test?  Laboratory test results may vary depending on your age, gender, health history, the method used for the test, and many other factors  If your results are different from the results suggested below, this may not mean that you have a disease  Contact your healthcare worker if you have any questions  The following is considered to be a normal result for this test:  · Negative  What follow up should I do after this test?  Ask your healthcare worker how you will be informed of the test results  You may be asked to call for results, schedule an appointment to discuss results, or notified of results by mail  Follow up care varies depending on many factors related to your test  Sometimes there is no follow up after you have been notified of test results  At other times follow up may be suggested or necessary  Some examples of follow up care include changes to medication or treatment plans, referral to a specialist, more or less frequent monitoring, and additional tests or procedures  Talk with your healthcare worker about any concerns or questions you have regarding follow up care or instructions  Where can I get more information? Related Companies   ?  Centers for Disease Control and Prevention (CDC) - Ref[employer]illa it  ? American Social Health Association (ANTONIO) - http://www parishVitalea Science com/  org  ? Division of STD Prevention, Centers for Disease Control and Prevention - RenTien com au    CARE AGREEMENT:   You have the right to help plan your care  To help with this plan, you must learn about your health condition and how it may be treated  You can then discuss treatment options with your caregivers  Work with them to decide what care may be used to treat you  You always have the right to refuse treatment  © Copyright [employer] 2018  Information is for End User's use only and may not be sold, redistributed or otherwise used for commercial purposes  The above information is an  only  It is not intended as a substitute for medical advice for your individual conditions or treatments  Talk to your doctor, nurse or pharmacist before following any medical regimen to see if it is safe and effective for you

## 2021-04-02 NOTE — PROGRESS NOTES
[facility] [address] [place] [address]    Assessment/Plan:    Diagnoses and all orders for this visit:    ASCUS with positive high risk HPV cervical  - reviewed information from ACOG and ASCCP regarding High risk HPV and ASCUS in relationship to age, treatment, and follow-up  - Questions asked and answered, verbalized understanding   - At this time agrees to plan to monitor- repeat pap smear and HPV screening in 1 year  I personally spent over half of a total 20 minutes face to face with the patient in counseling and discussion and/or coordination of care as described above  Subjective:   Yudelka Jimenez is a 23 y o  Peggi Robyn female  CC: discuss recent pap smear results    HPI: Lisbeth Diop presents today to discuss Pap smear and positive high-risk HPV results  This was her 1st Pap smear  Lisbeth Diop has done multiple searches and reviews of information related to HPV virus  She also was positive for herpes simplex type 1 in the genital area  She has documentation of completed Gardasil vaccination  She has been with current partner for a while  Does not request additional STI testing today  ROS: Review of Systems   Constitutional: Negative for activity change, appetite change, fatigue and fever  Respiratory: Negative for shortness of breath  Cardiovascular: Negative for chest pain, palpitations and leg swelling  Genitourinary: Negative for difficulty urinating, genital sores, menstrual problem, pelvic pain, vaginal bleeding, vaginal discharge and vaginal pain  Psychiatric/Behavioral: The patient is not nervous/anxious          PFSH: The following portions of the patient's history were reviewed and updated as appropriate: allergies, current medications, past family history, past medical history, obstetric history, gynecologic history, past social history, past surgical history and problem list        Objective:  /70 (BP Location: Left arm, Patient Position: Sitting, Cuff Size: Standard)   Ht 5' 6 5" (1 689 m)   Wt 78 8 kg (173 lb 11 2 oz)   LMP 03/02/2021 (Exact Date)   BMI 27 62 kg/m²    Physical Exam  Constitutional:       Appearance: Normal appearance  Pulmonary:      Effort: Pulmonary effort is normal    Neurological:      Mental Status: She is alert     Psychiatric:         Mood and Affect: Mood normal          Behavior: Behavior normal          Judgment: Judgment normal

## 2021-04-30 DIAGNOSIS — A60.04 HERPES SIMPLEX VULVOVAGINITIS: ICD-10-CM

## 2021-05-01 RX ORDER — NORGESTIMATE AND ETHINYL ESTRADIOL 0.25-0.035
KIT ORAL
Qty: 28 TABLET | Refills: 1 | Status: SHIPPED | OUTPATIENT
Start: 2021-05-01 | End: 2021-06-23

## 2021-06-14 ENCOUNTER — OFFICE VISIT (OUTPATIENT)
Dept: OBGYN CLINIC | Facility: MEDICAL CENTER | Age: 21
End: 2021-06-14
Payer: COMMERCIAL

## 2021-06-14 VITALS
HEIGHT: 67 IN | SYSTOLIC BLOOD PRESSURE: 112 MMHG | WEIGHT: 171 LBS | BODY MASS INDEX: 26.84 KG/M2 | DIASTOLIC BLOOD PRESSURE: 80 MMHG

## 2021-06-14 DIAGNOSIS — L73.9 FOLLICULITIS: Primary | ICD-10-CM

## 2021-06-14 PROCEDURE — 99213 OFFICE O/P EST LOW 20 MIN: CPT | Performed by: STUDENT IN AN ORGANIZED HEALTH CARE EDUCATION/TRAINING PROGRAM

## 2021-06-14 RX ORDER — MICONAZOLE NITRATE 20 MG/G
CREAM TOPICAL
COMMUNITY
Start: 2021-05-19 | End: 2021-10-03 | Stop reason: ALTCHOICE

## 2021-06-14 NOTE — ASSESSMENT & PLAN NOTE
- Mild ingrown hair on exam, no evidence of cellulitis, abscess, or herpes outbreak  - Recommended a OTC product for ingrown hair

## 2021-06-14 NOTE — PROGRESS NOTES
[facility] [address] [place] [address]    Assessment/Plan:  Brandin Garcia is a 23 y o  April Sims who presents with folliculitis  Folliculitis  - Mild ingrown hair on exam, no evidence of cellulitis, abscess, or herpes outbreak  - Recommended a OTC product for ingrown hair    Diagnoses and all orders for this visit:    Folliculitis    Other orders  -     Antifungal 2 % cream          Subjective:   Brandin Garcia is a 23 y o  April Sims female  CC: bump    HPI: Nell Gosselin presents with 1 week of small bump on the mons  She has a history of HSV but this does not have any burning  ROS: Review of Systems   Constitutional: Negative for chills and fever  Respiratory: Negative for cough and shortness of breath  Cardiovascular: Negative for chest pain and leg swelling  Gastrointestinal: Negative for abdominal pain, nausea and vomiting  Genitourinary: Negative for dysuria, frequency and urgency  Neurological: Negative for dizziness, light-headedness and headaches  PFSH: The following portions of the patient's history were reviewed and updated as appropriate: allergies, current medications, past family history, past medical history, obstetric history, gynecologic history, past social history, past surgical history and problem list        Objective:  /80   Ht 5' 6 5" (1 689 m)   Wt 77 6 kg (171 lb)   LMP 05/24/2021 (Approximate)   BMI 27 19 kg/m²    Physical Exam  Constitutional:       Appearance: Normal appearance  HENT:      Head: Normocephalic and atraumatic  Cardiovascular:      Rate and Rhythm: Normal rate  Pulmonary:      Effort: Pulmonary effort is normal    Abdominal:      General: There is no distension  Tenderness: There is no abdominal tenderness  There is no guarding  Genitourinary:     Exam position: Lithotomy position  Pubic Area: No rash  Labia:         Right: No rash, tenderness or lesion           Left: No rash, tenderness or lesion  Urethra: No prolapse, urethral swelling or urethral lesion  Vagina: No vaginal discharge, erythema, tenderness, bleeding or lesions  Cervix: No cervical motion tenderness, discharge, friability or erythema  Comments: Two small pinpoint follicular occlusions consistent with ingrown hair  No erythema or induration  Lymphadenopathy:      Lower Body: No right inguinal adenopathy  No left inguinal adenopathy  Neurological:      Mental Status: She is alert             Clarita Ramos MD  [address] [place] [address]  6/14/2021 3:41 PM

## 2021-06-23 DIAGNOSIS — A60.04 HERPES SIMPLEX VULVOVAGINITIS: ICD-10-CM

## 2021-06-23 RX ORDER — NORGESTIMATE AND ETHINYL ESTRADIOL 0.25-0.035
KIT ORAL
Qty: 28 TABLET | Refills: 1 | Status: SHIPPED | OUTPATIENT
Start: 2021-06-23 | End: 2021-08-10

## 2021-08-10 DIAGNOSIS — A60.04 HERPES SIMPLEX VULVOVAGINITIS: ICD-10-CM

## 2021-08-10 RX ORDER — NORGESTIMATE AND ETHINYL ESTRADIOL 0.25-0.035
KIT ORAL
Qty: 28 TABLET | Refills: 1 | Status: SHIPPED | OUTPATIENT
Start: 2021-08-10 | End: 2021-10-27

## 2021-09-30 ENCOUNTER — OFFICE VISIT (OUTPATIENT)
Dept: OBGYN CLINIC | Facility: MEDICAL CENTER | Age: 21
End: 2021-09-30
Payer: COMMERCIAL

## 2021-09-30 VITALS
SYSTOLIC BLOOD PRESSURE: 118 MMHG | WEIGHT: 168.4 LBS | DIASTOLIC BLOOD PRESSURE: 76 MMHG | HEIGHT: 67 IN | BODY MASS INDEX: 26.43 KG/M2

## 2021-09-30 DIAGNOSIS — N94.9 VAGINAL BURNING: ICD-10-CM

## 2021-09-30 DIAGNOSIS — B96.89 BV (BACTERIAL VAGINOSIS): Primary | ICD-10-CM

## 2021-09-30 DIAGNOSIS — N76.0 BV (BACTERIAL VAGINOSIS): Primary | ICD-10-CM

## 2021-09-30 LAB
BV WHIFF TEST VAG QL: POSITIVE
CLUE CELLS SPEC QL WET PREP: POSITIVE
PH SMN: 5 [PH]
SL AMB POCT WET MOUNT: ABNORMAL
T VAGINALIS VAG QL WET PREP: NEGATIVE
YEAST VAG QL WET PREP: NEGATIVE

## 2021-09-30 PROCEDURE — 87210 SMEAR WET MOUNT SALINE/INK: CPT | Performed by: ADVANCED PRACTICE MIDWIFE

## 2021-09-30 PROCEDURE — 99213 OFFICE O/P EST LOW 20 MIN: CPT | Performed by: ADVANCED PRACTICE MIDWIFE

## 2021-09-30 RX ORDER — METRONIDAZOLE 500 MG/1
500 TABLET ORAL EVERY 12 HOURS SCHEDULED
Qty: 14 TABLET | Refills: 0 | Status: SHIPPED | OUTPATIENT
Start: 2021-09-30 | End: 2021-10-07

## 2021-09-30 RX ORDER — TRIAMCINOLONE ACETONIDE 1 MG/G
CREAM TOPICAL
COMMUNITY
Start: 2021-08-03 | End: 2021-12-06

## 2021-09-30 RX ORDER — MOMETASONE FUROATE 1 MG/G
CREAM TOPICAL
COMMUNITY
Start: 2021-08-17

## 2021-10-25 DIAGNOSIS — A60.04 HERPES SIMPLEX VULVOVAGINITIS: ICD-10-CM

## 2021-10-27 RX ORDER — NORGESTIMATE AND ETHINYL ESTRADIOL 0.25-0.035
KIT ORAL
Qty: 28 TABLET | Refills: 1 | Status: SHIPPED | OUTPATIENT
Start: 2021-10-27 | End: 2021-12-15

## 2021-12-06 ENCOUNTER — OFFICE VISIT (OUTPATIENT)
Dept: OBGYN CLINIC | Facility: MEDICAL CENTER | Age: 21
End: 2021-12-06
Payer: COMMERCIAL

## 2021-12-06 VITALS
WEIGHT: 171 LBS | SYSTOLIC BLOOD PRESSURE: 110 MMHG | BODY MASS INDEX: 27.48 KG/M2 | DIASTOLIC BLOOD PRESSURE: 70 MMHG | HEIGHT: 66 IN

## 2021-12-06 DIAGNOSIS — M79.621 PAIN IN RIGHT AXILLA: Primary | ICD-10-CM

## 2021-12-06 PROBLEM — L73.9 FOLLICULITIS: Status: RESOLVED | Noted: 2021-06-14 | Resolved: 2021-12-06

## 2021-12-06 PROCEDURE — 99212 OFFICE O/P EST SF 10 MIN: CPT | Performed by: NURSE PRACTITIONER

## 2021-12-15 DIAGNOSIS — A60.04 HERPES SIMPLEX VULVOVAGINITIS: ICD-10-CM

## 2021-12-15 RX ORDER — NORGESTIMATE AND ETHINYL ESTRADIOL 0.25-0.035
KIT ORAL
Qty: 28 TABLET | Refills: 1 | Status: SHIPPED | OUTPATIENT
Start: 2021-12-15 | End: 2022-03-28

## 2022-02-10 ENCOUNTER — TELEPHONE (OUTPATIENT)
Dept: OBGYN CLINIC | Facility: MEDICAL CENTER | Age: 22
End: 2022-02-10

## 2022-03-22 NOTE — PROGRESS NOTES
[facility] [address] [place] [address]    ASSESSMENT/PLAN: Angelita Johnson is a 24 y o  Carleen Snjim who presents for annual gynecologic exam     Encounter for routine gynecologic examination  - Routine well woman exam completed today  - Cervical Cancer Screening: Current ASCCP Guidelines reviewed  Last Pap: 03/18/2021 Ascus with HPV  Next Pap Due: today  - HPV Vaccination status: Immunization series complete  - STI screening offered including HIV testing: not indicated based on hx or requested at time of visit  - Contraceptive counseling discussed  Current contraception: combination OCPs:   - RTO 1 yr    Additional problems addressed during this visit:  1  Encounter for gynecological examination without abnormal finding  -     Liquid-based pap, screening    2  Encounter for surveillance of contraceptive pills        - Risk/benefits, side effects and administration reviewed  Questions asked and answered  Verbalizes understanding          - will call when needs refill  3  ASCUS with positive high risk HPV cervical  -     Liquid-based pap, screening    4  Herpes simplex vulvovaginitis  -     valACYclovir (VALTREX) 500 mg tablet; Take 1 tablet (500 mg total) by mouth daily        -     Taking Valtrex daily for suppression        -     Will wean off next year  CC:  Annual Gynecologic Examination    HPI: Angelita Johnson is a 24 y o  Carleenangelito Waller who presents for annual gynecologic examination  Demario Sean presents for gyn exam today  3/10/22 LMP  Menses is regular, flow light to heavy, every 28 days  Using OCP as birth control method  Happy with method  Last pap smear Ascus and HPV   7-8 hrs sleep per day  4 days exercise per week  Minimal servings of caffeine per day   Concerns none        The following portions of the patient's history were reviewed and updated as appropriate: allergies, current medications, past family history, past medical history, obstetric history, gynecologic history, past social history, past surgical history and problem list     Review of Systems   Constitutional: Negative for activity change, appetite change, fatigue and fever  Respiratory: Negative for cough and shortness of breath  Cardiovascular: Negative for chest pain, palpitations and leg swelling  Gastrointestinal: Negative for constipation and diarrhea  Genitourinary: Negative for difficulty urinating, frequency, genital sores, menstrual problem, pelvic pain, vaginal bleeding, vaginal discharge and vaginal pain  Neurological: Negative for light-headedness and headaches  Psychiatric/Behavioral: The patient is not nervous/anxious  Objective:  /76 (BP Location: Right arm, Patient Position: Sitting, Cuff Size: Standard)   Ht 5' 6" (1 676 m)   Wt 77 9 kg (171 lb 12 8 oz)   LMP 03/10/2022 (Exact Date)   BMI 27 73 kg/m²    Physical Exam  Vitals reviewed  Constitutional:       Appearance: Normal appearance  HENT:      Head: Normocephalic  Neck:      Thyroid: No thyroid mass or thyroid tenderness  Cardiovascular:      Rate and Rhythm: Normal rate and regular rhythm  Heart sounds: Normal heart sounds  Pulmonary:      Effort: Pulmonary effort is normal       Breath sounds: Normal breath sounds  Chest:   Breasts:      Right: No mass, nipple discharge, skin change, tenderness or axillary adenopathy  Left: No mass, nipple discharge, skin change, tenderness or axillary adenopathy  Abdominal:      General: There is no distension  Palpations: There is no mass  Tenderness: There is no abdominal tenderness  There is no guarding  Genitourinary:     General: Normal vulva  Exam position: Lithotomy position  Labia:         Right: No tenderness or lesion  Left: No tenderness or lesion  Vagina: No vaginal discharge, tenderness, bleeding or lesions  Cervix: No discharge, lesion, erythema or cervical bleeding        Uterus: Normal  Not enlarged and not tender  Adnexa:         Right: No mass, tenderness or fullness  Left: No mass, tenderness or fullness  Musculoskeletal:      Cervical back: Normal range of motion  Lymphadenopathy:      Upper Body:      Right upper body: No axillary adenopathy  Left upper body: No axillary adenopathy  Skin:     General: Skin is warm and dry  Neurological:      Mental Status: She is alert     Psychiatric:         Mood and Affect: Mood normal          Behavior: Behavior normal          Judgment: Judgment normal

## 2022-03-24 ENCOUNTER — ANNUAL EXAM (OUTPATIENT)
Dept: OBGYN CLINIC | Facility: MEDICAL CENTER | Age: 22
End: 2022-03-24
Payer: COMMERCIAL

## 2022-03-24 VITALS
BODY MASS INDEX: 27.61 KG/M2 | DIASTOLIC BLOOD PRESSURE: 76 MMHG | SYSTOLIC BLOOD PRESSURE: 124 MMHG | HEIGHT: 66 IN | WEIGHT: 171.8 LBS

## 2022-03-24 DIAGNOSIS — R87.610 ASCUS WITH POSITIVE HIGH RISK HPV CERVICAL: ICD-10-CM

## 2022-03-24 DIAGNOSIS — A60.04 HERPES SIMPLEX VULVOVAGINITIS: ICD-10-CM

## 2022-03-24 DIAGNOSIS — R87.810 ASCUS WITH POSITIVE HIGH RISK HPV CERVICAL: ICD-10-CM

## 2022-03-24 DIAGNOSIS — Z30.41 ENCOUNTER FOR SURVEILLANCE OF CONTRACEPTIVE PILLS: ICD-10-CM

## 2022-03-24 DIAGNOSIS — Z01.419 ENCOUNTER FOR GYNECOLOGICAL EXAMINATION WITHOUT ABNORMAL FINDING: Primary | ICD-10-CM

## 2022-03-24 PROCEDURE — 99395 PREV VISIT EST AGE 18-39: CPT | Performed by: ADVANCED PRACTICE MIDWIFE

## 2022-03-24 PROCEDURE — 0503F POSTPARTUM CARE VISIT: CPT | Performed by: ADVANCED PRACTICE MIDWIFE

## 2022-03-24 PROCEDURE — G0145 SCR C/V CYTO,THINLAYER,RESCR: HCPCS | Performed by: PATHOLOGY

## 2022-03-24 PROCEDURE — G0124 SCREEN C/V THIN LAYER BY MD: HCPCS | Performed by: PATHOLOGY

## 2022-03-24 RX ORDER — VALACYCLOVIR HYDROCHLORIDE 500 MG/1
500 TABLET, FILM COATED ORAL DAILY
Qty: 30 TABLET | Refills: 11 | Status: SHIPPED | OUTPATIENT
Start: 2022-03-24 | End: 2023-03-19

## 2022-03-25 DIAGNOSIS — A60.04 HERPES SIMPLEX VULVOVAGINITIS: ICD-10-CM

## 2022-03-28 RX ORDER — NORGESTIMATE AND ETHINYL ESTRADIOL 0.25-0.035
KIT ORAL
Qty: 28 TABLET | Refills: 1 | Status: SHIPPED | OUTPATIENT
Start: 2022-03-28 | End: 2022-05-27

## 2022-03-30 LAB
LAB AP GYN PRIMARY INTERPRETATION: ABNORMAL
Lab: ABNORMAL
PATH INTERP SPEC-IMP: ABNORMAL

## 2022-04-01 LAB
HPV HR 12 DNA CVX QL NAA+PROBE: ABNORMAL
HPV16 DNA CVX QL NAA+PROBE: ABNORMAL
HPV18 DNA CVX QL NAA+PROBE: ABNORMAL

## 2022-04-04 ENCOUNTER — TELEPHONE (OUTPATIENT)
Dept: OBGYN CLINIC | Facility: MEDICAL CENTER | Age: 22
End: 2022-04-04

## 2022-04-04 NOTE — TELEPHONE ENCOUNTER
The patient called concerning her results and I was able to get her in for an appointment with you for this week for the hpv to be repeated

## 2022-05-11 NOTE — PROGRESS NOTES
Assessment/Plan:      Diagnoses and all orders for this visit:    HPV in female  -     HPV High Risk      -reviewed with patient HPV will be resulted in 1-2 weeks  Will call with results once available  Reviewed treatment for abnormal Pap smear will depend on HPV results  -signs and symptoms report reviewed    RTO 3/2023 for annual exam or sooner as needed    Subjective:     Patient ID: Amy Lee is a 24 y o  female  HPI G0 here for HPV testing  Recent Pap smear resulted with invalid HPV portion  Pap smear history 3/18/21 ASCUS/ HR HPV +; 3/24/22 ASCUS/ HR HPV invalid   Had gardasil vaccine     Review of Systems   Constitutional: Negative for chills, fatigue and fever  Respiratory: Negative  Cardiovascular: Negative  Genitourinary: Negative  Objective:  /58   Ht 5' 6" (1 676 m)   Wt 78 kg (172 lb)   LMP 05/01/2022   BMI 27 76 kg/m²      Physical Exam  Vitals reviewed  Exam conducted with a chaperone present  Constitutional:       Appearance: Normal appearance  Genitourinary:     General: Normal vulva  Vagina: Normal       Cervix: Normal    Neurological:      Mental Status: She is alert and oriented to person, place, and time     Psychiatric:         Mood and Affect: Mood normal          Behavior: Behavior normal

## 2022-05-11 NOTE — PATIENT INSTRUCTIONS
Thank you for visiting [facility]  You may be invited to complete a survey about you visit  Your responses will help us improve care we provide  A 10 means the care you received at your visit met your expectations  If you are unable to give a 10 please list reasons so we can work on improving your patient experience

## 2022-05-13 ENCOUNTER — OFFICE VISIT (OUTPATIENT)
Dept: OBGYN CLINIC | Facility: MEDICAL CENTER | Age: 22
End: 2022-05-13
Payer: COMMERCIAL

## 2022-05-13 VITALS
BODY MASS INDEX: 27.64 KG/M2 | SYSTOLIC BLOOD PRESSURE: 104 MMHG | HEIGHT: 66 IN | WEIGHT: 172 LBS | DIASTOLIC BLOOD PRESSURE: 58 MMHG

## 2022-05-13 DIAGNOSIS — B97.7 HPV IN FEMALE: Primary | ICD-10-CM

## 2022-05-13 PROCEDURE — 99212 OFFICE O/P EST SF 10 MIN: CPT | Performed by: NURSE PRACTITIONER

## 2022-05-13 PROCEDURE — 87624 HPV HI-RISK TYP POOLED RSLT: CPT | Performed by: NURSE PRACTITIONER

## 2022-05-16 LAB
HPV HR 12 DNA CVX QL NAA+PROBE: NEGATIVE
HPV16 DNA CVX QL NAA+PROBE: NEGATIVE
HPV18 DNA CVX QL NAA+PROBE: NEGATIVE

## 2022-05-26 DIAGNOSIS — A60.04 HERPES SIMPLEX VULVOVAGINITIS: ICD-10-CM

## 2022-05-27 RX ORDER — NORGESTIMATE AND ETHINYL ESTRADIOL 0.25-0.035
KIT ORAL
Qty: 28 TABLET | Refills: 1 | Status: SHIPPED | OUTPATIENT
Start: 2022-05-27 | End: 2022-08-06

## 2022-08-05 DIAGNOSIS — A60.04 HERPES SIMPLEX VULVOVAGINITIS: ICD-10-CM

## 2022-08-06 RX ORDER — NORGESTIMATE AND ETHINYL ESTRADIOL 0.25-0.035
KIT ORAL
Qty: 28 TABLET | Refills: 1 | Status: SHIPPED | OUTPATIENT
Start: 2022-08-06 | End: 2022-10-10

## 2022-10-09 DIAGNOSIS — A60.04 HERPES SIMPLEX VULVOVAGINITIS: ICD-10-CM

## 2022-10-10 RX ORDER — NORGESTIMATE AND ETHINYL ESTRADIOL 0.25-0.035
KIT ORAL
Qty: 28 TABLET | Refills: 1 | Status: SHIPPED | OUTPATIENT
Start: 2022-10-10

## 2022-12-08 DIAGNOSIS — A60.04 HERPES SIMPLEX VULVOVAGINITIS: ICD-10-CM

## 2022-12-08 NOTE — TELEPHONE ENCOUNTER
Pt called in would like a refill of medication, pharmacy on file   Please review when available thank you

## 2022-12-12 RX ORDER — NORGESTIMATE AND ETHINYL ESTRADIOL 0.25-0.035
1 KIT ORAL DAILY
Qty: 28 TABLET | Refills: 1 | Status: SHIPPED | OUTPATIENT
Start: 2022-12-12

## 2023-01-04 DIAGNOSIS — A60.04 HERPES SIMPLEX VULVOVAGINITIS: ICD-10-CM

## 2023-01-04 RX ORDER — VALACYCLOVIR HYDROCHLORIDE 500 MG/1
500 TABLET, FILM COATED ORAL DAILY
Qty: 30 TABLET | Refills: 11 | Status: SHIPPED | OUTPATIENT
Start: 2023-01-04 | End: 2023-12-30

## 2023-01-04 RX ORDER — NORGESTIMATE AND ETHINYL ESTRADIOL 0.25-0.035
1 KIT ORAL DAILY
Qty: 28 TABLET | Refills: 1 | OUTPATIENT
Start: 2023-01-04

## 2023-01-04 NOTE — TELEPHONE ENCOUNTER
Pt called requesting BC refill  Pharmacy on file confirmed  Please check pended medication details  Pt is scheduled for yearly

## 2023-02-22 NOTE — PROGRESS NOTES
[facility] [address] [place] [address]    Assessment/Plan:  No problem-specific Assessment & Plan notes found for this encounter  Diagnoses and all orders for this visit:    Left axillary pain  -     US axilla; Future    Axillary lump, left  -     US axilla; Future    - current lump consistent with ingrown hair, will use Hibiclens to wash and when shaving  - if develops larger lump that she has felt in past to schedule ultrasound  Subjective:   Imani Novak is a 20 y o  [place] female  CC: lump left underarm    HPI: Jorje Simon states that she has been experiencing pain in the right and left axilla  Today she notes it to be on the left side and in a new location and never this small  Notes that it is painful and then less painful as of today  Also notes that she had additional lump with pain  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 noted any change in occurrence  In past she has had them on right side also  No flu like symptoms occur with lumps  States that they are random  They do not come to the surface, notes deeper in axillary region  No redness or warmth  Notes area of firmness that she feels is about the size of a dime  She is only feeling on left side this week and has not felt any on right side in over 3 months  She has tried changing deodorant and antiperspirants  Was treated at one time for hydradenitis, but later was told that was not what she had  ROS: Review of Systems   Constitutional: Negative for activity change, chills, diaphoresis, fatigue and fever  Endocrine: Negative for cold intolerance and heat intolerance  Genitourinary: Negative for menstrual problem         PFSH: The following portions of the patient's history were reviewed and updated as appropriate: allergies, current medications, past family history, past medical history, obstetric history, gynecologic history, past social history, past surgical history and problem list  Objective:  /62   Ht 5' 6" (1 676 m)   Wt 74 5 kg (164 lb 4 8 oz)   LMP 01/12/2023 (Exact Date)   BMI 26 52 kg/m²    Physical Exam  Constitutional:       Appearance: Normal appearance  Pulmonary:      Effort: Pulmonary effort is normal    Chest:   Breasts:     Right: No mass or nipple discharge  Left: No mass or nipple discharge  Comments: Left axilla - small follicular cyst firm, without redness, or drainage  Arden Aguilar notes that lump she has had in past is center of axillary area and can be as large as a nickel  It is not palpable at this time  Neurological:      Mental Status: She is alert and oriented to person, place, and time     Psychiatric:         Mood and Affect: Mood normal          Behavior: Behavior normal

## 2023-02-23 ENCOUNTER — OFFICE VISIT (OUTPATIENT)
Dept: OBGYN CLINIC | Facility: MEDICAL CENTER | Age: 23
End: 2023-02-23

## 2023-02-23 VITALS
DIASTOLIC BLOOD PRESSURE: 62 MMHG | HEIGHT: 66 IN | WEIGHT: 164.3 LBS | SYSTOLIC BLOOD PRESSURE: 102 MMHG | BODY MASS INDEX: 26.41 KG/M2

## 2023-02-23 DIAGNOSIS — M79.622 LEFT AXILLARY PAIN: Primary | ICD-10-CM

## 2023-02-23 DIAGNOSIS — R22.32 AXILLARY LUMP, LEFT: ICD-10-CM

## 2023-03-02 DIAGNOSIS — A60.04 HERPES SIMPLEX VULVOVAGINITIS: ICD-10-CM

## 2023-03-02 RX ORDER — NORGESTIMATE AND ETHINYL ESTRADIOL 0.25-0.035
1 KIT ORAL DAILY
Qty: 28 TABLET | Refills: 1 | Status: SHIPPED | OUTPATIENT
Start: 2023-03-02

## 2023-03-02 NOTE — TELEPHONE ENCOUNTER
Patient called about a refill for her birth control [facility]  Pharmacy on file([facility])Yearly scheduled for 03/30/23  Please review when you get a chance   Thank you

## 2023-04-20 ENCOUNTER — HOSPITAL ENCOUNTER (OUTPATIENT)
Dept: MAMMOGRAPHY | Facility: CLINIC | Age: 23
Discharge: HOME | End: 2023-04-20

## 2023-04-20 VITALS — WEIGHT: 164 LBS | HEIGHT: 66 IN | BODY MASS INDEX: 26.36 KG/M2

## 2023-04-20 DIAGNOSIS — R22.32 AXILLARY LUMP, LEFT: ICD-10-CM

## 2023-04-20 DIAGNOSIS — M79.622 LEFT AXILLARY PAIN: ICD-10-CM

## 2023-05-01 ENCOUNTER — TELEPHONE (OUTPATIENT)
Dept: OBGYN CLINIC | Facility: MEDICAL CENTER | Age: 23
End: 2023-05-01

## 2023-05-01 NOTE — TELEPHONE ENCOUNTER
Talked with patient  Reviewed her pap results with her again and that we are still waiting for the HPV to come back

## 2023-08-03 ENCOUNTER — TELEPHONE (OUTPATIENT)
Dept: OBGYN CLINIC | Facility: MEDICAL CENTER | Age: 23
End: 2023-08-03

## 2023-08-03 NOTE — TELEPHONE ENCOUNTER
PT would like to know when is the best time to take a pregnancy test. LMP 7/16/23. Please advise, thank you.

## 2023-08-03 NOTE — TELEPHONE ENCOUNTER
Advised patient that she should test for pregnancy if she is late for her period or around 4 weeks.   Patient verbalized understanding and will reach out to office with any additional questions or concerns

## 2023-08-18 ENCOUNTER — TELEPHONE (OUTPATIENT)
Dept: OBGYN CLINIC | Facility: MEDICAL CENTER | Age: 23
End: 2023-08-18

## 2023-08-18 NOTE — TELEPHONE ENCOUNTER
Returned patient's call. Discussed concerns with patient. Aware of possible side effects and that they could last for up to 3 months. Will start placebo next week and will most likely have bleeding then. Patient advised to call back with any further questions or concerns.

## 2023-08-18 NOTE — TELEPHONE ENCOUNTER
Pt called. Started BC at the beginning of the month. Symptoms: Swollen boobs and tender. Also missed her period, was supposed to star on the 08/13. She did have really bad cramps during those days but no bleeding, wanting to know if because of the pills she will be having her period next week. Have been in [facility] before and this have never happened. Wanting to have advice. Please review when you have chance, ty!

## 2023-08-22 ENCOUNTER — TELEPHONE (OUTPATIENT)
Dept: OBGYN CLINIC | Facility: MEDICAL CENTER | Age: 23
End: 2023-08-22

## 2023-08-22 NOTE — TELEPHONE ENCOUNTER
Called pt back pt is considering having a  explain to pt our office don't provide those kind a procedure. [facility]

## 2023-12-12 ENCOUNTER — TELEPHONE (OUTPATIENT)
Dept: OBGYN CLINIC | Facility: MEDICAL CENTER | Age: 23
End: 2023-12-12

## 2023-12-12 NOTE — TELEPHONE ENCOUNTER
Pt called has been taking her medication for 5 days its been working but slowly. Should she finish the rest of medication. Please review.

## 2024-01-07 DIAGNOSIS — A60.04 HERPES SIMPLEX VULVOVAGINITIS: ICD-10-CM

## 2024-01-07 RX ORDER — VALACYCLOVIR HYDROCHLORIDE 500 MG/1
500 TABLET, FILM COATED ORAL DAILY
Qty: 30 TABLET | Refills: 11 | Status: SHIPPED | OUTPATIENT
Start: 2024-01-07 | End: 2025-01-01

## 2024-02-14 ENCOUNTER — OFFICE VISIT (OUTPATIENT)
Dept: OBGYN CLINIC | Facility: MEDICAL CENTER | Age: 24
End: 2024-02-14
Payer: COMMERCIAL

## 2024-02-14 VITALS — WEIGHT: 160.7 LBS | BODY MASS INDEX: 25.83 KG/M2 | HEIGHT: 66 IN

## 2024-02-14 DIAGNOSIS — B96.89 BACTERIAL VAGINOSIS: Primary | ICD-10-CM

## 2024-02-14 DIAGNOSIS — N76.0 BACTERIAL VAGINOSIS: Primary | ICD-10-CM

## 2024-02-14 PROCEDURE — 99213 OFFICE O/P EST LOW 20 MIN: CPT | Performed by: OBSTETRICS & GYNECOLOGY

## 2024-02-14 RX ORDER — METRONIDAZOLE 500 MG/1
500 TABLET ORAL EVERY 12 HOURS SCHEDULED
Qty: 14 TABLET | Refills: 0 | Status: SHIPPED | OUTPATIENT
Start: 2024-02-14 | End: 2024-02-21

## 2024-02-14 NOTE — PATIENT INSTRUCTIONS
Bacterial Vaginosis   WHAT YOU NEED TO KNOW:   What is bacterial vaginosis?  Bacterial vaginosis is an infection in the vagina. It may cause vaginitis (irritation and inflammation of the vagina). The cause is not known. Bacteria normally found in the vagina are imbalanced. Your risk increases if you are sexually active, you use a douche, or you have an intrauterine device (IUD).       What are common signs and symptoms of bacterial vaginosis?   White, gray, or yellow vaginal discharge    Vaginal discharge that smells like fish    Itching or burning around the outside of your vagina    How is bacterial vaginosis diagnosed?  Your healthcare provider will examine you and ask if you have other health conditions. He or she may need to take a sample of fluid from your vagina. This will be tested for the bacteria that causes vaginosis.  How is bacterial vaginosis treated?  Antibiotics are given to kill the bacteria. They may be given as a pill or as a cream to put in your vagina.  What do I need to know about bacterial vaginosis and pregnancy?  If you have bacterial vaginosis during pregnancy, your baby may be born early or have a low birth weight. Your healthcare provider may recommend testing for bacterial vaginosis before or during your pregnancy. He or she will talk to you about your risk for premature delivery, and make sure you know the benefits and risks of testing.  How can I prevent bacterial vaginosis?   Keep your vaginal area clean and dry.  Wear underwear and pantyhose with a cotton crotch. Wipe from front to back after you urinate or have a bowel movement. After you bathe, rinse soap from your vaginal area to decrease your risk for irritation.    Do not use products that cause irritation.  Always use unscented tampons or sanitary pads. Do not use feminine sprays, powders, or scented tampons. They may cause irritation and increase your risk for vaginosis. Detergents and fabric softeners may also cause  irritation.    Do not use a douche.  This can cause an imbalance in healthy vaginal bacteria.    Use latex condoms during sex.  This helps prevent another infection and keeps your partner from getting the infection.    When should I call my doctor or gynecologist?   Your symptoms come back or do not improve with treatment.    You have vaginal bleeding that is not your monthly period.    You have questions or concerns about your condition or care.    CARE AGREEMENT:   You have the right to help plan your care. Learn about your health condition and how it may be treated. Discuss treatment options with your healthcare providers to decide what care you want to receive. You always have the right to refuse treatment. The above information is an  only. It is not intended as medical advice for individual conditions or treatments. Talk to your doctor, nurse or pharmacist before following any medical regimen to see if it is safe and effective for you.  © Copyright Merative 2023 Information is for End User's use only and may not be sold, redistributed or otherwise used for commercial purposes.

## 2024-02-14 NOTE — PROGRESS NOTES
Assessment:   Aiyana was seen today for vaginal discharge.    Diagnoses and all orders for this visit:    Bacterial vaginosis  -     metroNIDAZOLE (FLAGYL) 500 mg tablet; Take 1 tablet (500 mg total) by mouth every 12 (twelve) hours for 7 days        Plan:  Patient to start Flagyl x 7 days.  Instructions reviewed and prescription sent to patient's pharmacy.  Also reviewed frequency and dosing of Valtrex with future outbreaks.  All questions answered.    Subjective:   Ms.Taylor Manuel is a 23 y.o. yo female who presents for whitish and some itching.  No odor.  +SA.  H/o HSV 1.   Pt reports throbbing pain 6 days ago around her anus and vagina.  Pt took valtrex twice daily and has been on it since.  Not sure when she should stop twice daily and go back to daily.  After throbbing resolved, pt reports some itching.   Pt reports she is eating .  No changes in soaps, detergents, activity or sexual partner.        ROS:   She denies hematuria, dysuria, constipation, diarrhea, fevers, chills, nausea or emesis.    Patient Active Problem List   Diagnosis    HSV-1 infection    Telogen effluvium       Past Medical History:   Diagnosis Date    Abnormal Pap smear of cervix     HPV (human papilloma virus) infection     Urogenital trichomoniasis        Social History     Socioeconomic History    Marital status: Single     Spouse name: Not on file    Number of children: Not on file    Years of education: Not on file    Highest education level: Not on file   Occupational History    Not on file   Tobacco Use    Smoking status: Never    Smokeless tobacco: Never   Vaping Use    Vaping status: Never Used   Substance and Sexual Activity    Alcohol use: Yes     Comment: social    Drug use: No    Sexual activity: Yes     Partners: Male     Birth control/protection: Pill   Other Topics Concern    Not on file   Social History Narrative    Not on file     Social Determinants of Health     Financial Resource Strain: Not on file   Food  "Insecurity: Not on file   Transportation Needs: Not on file   Physical Activity: Not on file   Stress: Not on file   Social Connections: Not on file   Intimate Partner Violence: Not on file   Housing Stability: Not on file         Current Outpatient Medications:     metroNIDAZOLE (FLAGYL) 500 mg tablet, Take 1 tablet (500 mg total) by mouth every 12 (twelve) hours for 7 days, Disp: 14 tablet, Rfl: 0    Shirley 0.25-35 MG-MCG per tablet, TAKE 1 TABLET BY MOUTH EVERY DAY, Disp: 28 tablet, Rfl: 11    mometasone (ELOCON) 0.1 % cream, , Disp: , Rfl:     valACYclovir (VALTREX) 500 mg tablet, Take 1 tablet (500 mg total) by mouth daily, Disp: 30 tablet, Rfl: 11    Allergies   Allergen Reactions    Cephalosporins Hives    Erythromycin Base Hives    Amoxicillin Hives and Rash    Erythromycin Rash    Sulfamethoxazole-Trimethoprim Rash       Ht 5' 6\" (1.676 m)   Wt 72.9 kg (160 lb 11.2 oz)   BMI 25.94 kg/m²     GEN: The patient was alert and oriented x3, pleasant well-appearing female in no acute distress.   Pelvic: Normal appearing external female genitalia, normal vaginal epithelium, normalappearing cervix. positive for small amt of whitish discharge noted.      Wet Prep: Clue cells positive, Hyphae negative, Trichomonas negative     "

## 2024-05-07 ENCOUNTER — NURSE TRIAGE (OUTPATIENT)
Age: 24
End: 2024-05-07

## 2024-05-07 NOTE — TELEPHONE ENCOUNTER
"Patient called reporting R breast pain that she has been having for the past 2-3 weeks.  She states she had the pain in between having her periods.  She confirms taking 2 home pregnancy test and receiving negative results.  Patient denies any redness or irritation around breast and states the pain is inside of the breast.  She denies any lumps.  Advised patient to take another home pregnancy test at the end of the week and monitor her bleeding for menses as could be related to menstrual breast tenderness.  Advised to give a call back to confirm if menses or pregnant to keep appointment.  She verbalized understanding and voiced appreciation for phone call.      Reason for Disposition   Breast pain and cause is not known    Answer Assessment - Initial Assessment Questions  1. SYMPTOM: \"What's the main symptom you're concerned about?\"  (e.g., lump, pain, rash, nipple discharge)      Right breast pain   2. LOCATION: \"Where is the pain  located?\"      Right breast   3. ONSET: \"When did pain   start?\"      2-3 weeks now   4. PRIOR HISTORY: \"Do you have any history of prior problems with your breasts?\" (e.g., lumps, cancer, fibrocystic breast disease)      Denies   5. CAUSE: \"What do you think is causing this symptom?\"      Unknown   6. OTHER SYMPTOMS: \"Do you have any other symptoms?\" (e.g., fever, breast pain, redness or rash, nipple discharge)      Abdominal pain   7. PREGNANCY-BREASTFEEDING: \"Is there any chance you are pregnant?\" \"When was your last menstrual period?\" \"Are you breastfeeding?\"      Denies    Protocols used: Breast Symptoms-ADULT-[place]    "

## 2024-05-07 NOTE — TELEPHONE ENCOUNTER
----- Message from Maria Luisa Escoto sent at 5/7/2024 11:53 AM EDT -----  Patient has r breast pain with abd cramping-no CTS available at time of appt

## 2024-05-15 DIAGNOSIS — A60.04 HERPES SIMPLEX VULVOVAGINITIS: ICD-10-CM

## 2024-05-15 RX ORDER — NORGESTIMATE AND ETHINYL ESTRADIOL 0.25-0.035
1 KIT ORAL DAILY
Qty: 28 TABLET | Refills: 11 | OUTPATIENT
Start: 2024-05-15

## 2024-05-17 RX ORDER — NORGESTIMATE AND ETHINYL ESTRADIOL 0.25-0.035
1 KIT ORAL DAILY
Qty: 28 TABLET | Refills: 0 | Status: SHIPPED | OUTPATIENT
Start: 2024-05-17

## 2024-05-17 NOTE — TELEPHONE ENCOUNTER
Patient is asking for a courtesy refill of birth control, Preethi 0.25-35 MG-MCG, until upcoming yearly exam scheduled for 5/28/24 with Dr. Gustafson. She will be out of birth control as of tomorrow.

## 2024-05-28 ENCOUNTER — ANNUAL EXAM (OUTPATIENT)
Dept: OBGYN CLINIC | Facility: MEDICAL CENTER | Age: 24
End: 2024-05-28
Payer: COMMERCIAL

## 2024-05-28 VITALS
WEIGHT: 152.9 LBS | SYSTOLIC BLOOD PRESSURE: 110 MMHG | DIASTOLIC BLOOD PRESSURE: 64 MMHG | BODY MASS INDEX: 24 KG/M2 | HEIGHT: 67 IN

## 2024-05-28 DIAGNOSIS — Z01.419 ROUTINE GYNECOLOGICAL EXAMINATION: Primary | ICD-10-CM

## 2024-05-28 DIAGNOSIS — A60.04 HERPES SIMPLEX VULVOVAGINITIS: ICD-10-CM

## 2024-05-28 PROCEDURE — G0476 HPV COMBO ASSAY CA SCREEN: HCPCS | Performed by: OBSTETRICS & GYNECOLOGY

## 2024-05-28 PROCEDURE — 99395 PREV VISIT EST AGE 18-39: CPT | Performed by: OBSTETRICS & GYNECOLOGY

## 2024-05-28 PROCEDURE — G0145 SCR C/V CYTO,THINLAYER,RESCR: HCPCS | Performed by: OBSTETRICS & GYNECOLOGY

## 2024-05-28 RX ORDER — NORGESTIMATE AND ETHINYL ESTRADIOL 0.25-0.035
1 KIT ORAL DAILY
Qty: 84 TABLET | Refills: 3 | Status: SHIPPED | OUTPATIENT
Start: 2024-05-28

## 2024-05-28 NOTE — PROGRESS NOTES
ASSESSMENT & PLAN: Aiyana Manuel is a 23 y.o.  with normal gynecologic exam.    1.  Routine well woman exam done today  2.  Pap:  The patient's last pap was .    It was abnormal.    Pap was done today.    Current ASCCP Guidelines reviewed.   3.  STD testing  was not done   4.  Gardasil recommendations reviewed  is vaccinated.  5. The following were reviewed in today's visit: breast self exam, use and side effects of OCPs, family planning choices, exercise, and healthy diet  We discussed pap smears from before [facility] since     CC:  Annual Gynecologic Examination    HPI: Aiyana Manuel is a 23 y.o.  who presents for annual gynecologic examination.    She has the following concerns:  none    Health Maintenance:    She wears her seatbelt routinely.    She does perform regular monthly self breast exams.    She feels safe at home.     Past Medical History:   Diagnosis Date    Abnormal Pap smear of cervix     HPV (human papilloma virus) infection     Urogenital trichomoniasis        History reviewed. No pertinent surgical history.    OB/Gyn History:    OB History          0    Para   0    Term   0       0    AB   0    Living   0         SAB   0    IAB   0    Ectopic   0    Multiple   0    Live Births   0                 Family History   Problem Relation Age of Onset    No Known Problems Mother     No Known Problems Father     No Known Problems Sister     No Known Problems Brother     No Known Problems Maternal Grandmother     No Known Problems Maternal Grandfather     No Known Problems Paternal Grandmother     No Known Problems Paternal Grandfather     Ovarian cancer Family        Social History:  Social History     Socioeconomic History    Marital status: Single     Spouse name: Not on file    Number of children: Not on file    Years of education: Not on file    Highest education level: Not on file   Occupational History    Not on file   Tobacco Use    Smoking status: Never    Smokeless  tobacco: Never   Vaping Use    Vaping status: Never Used   Substance and Sexual Activity    Alcohol use: Yes     Comment: social    Drug use: No    Sexual activity: Yes     Partners: Male     Birth control/protection: Pill   Other Topics Concern    Not on file   Social History Narrative    Not on file     Social Determinants of Health     Financial Resource Strain: Not on file   Food Insecurity: Not on file   Transportation Needs: Not on file   Physical Activity: Not on file   Stress: Not on file   Social Connections: Not on file   Intimate Partner Violence: Not on file   Housing Stability: Not on file       Allergies   Allergen Reactions    Cephalosporins Hives    Erythromycin Base Hives    Amoxicillin Hives and Rash    Erythromycin Rash    Sulfamethoxazole-Trimethoprim Rash         Current Outpatient Medications:     norgestimate-ethinyl estradiol (Shirley) 0.25-35 MG-MCG per tablet, Take 1 tablet by mouth daily, Disp: 84 tablet, Rfl: 3    valACYclovir (VALTREX) 500 mg tablet, Take 1 tablet (500 mg total) by mouth daily, Disp: 30 tablet, Rfl: 11    mometasone (ELOCON) 0.1 % cream, , Disp: , Rfl:     Review of Systems:  Constitutional :no fever, feels well, no tiredness, no recent weight gain or loss  ENT: no ear ache, no loss of hearing, no nosebleeds or nasal discharge, no sore throat or hoarseness.  Cardiovascular: no complaints of slow or fast heart beat, no chest pain, no palpitations, no leg claudication or lower extremity edema.  Respiratory: no complaints of shortness of shortness of breath, no HADDAD  Breasts:no complaints of breast pain, breast lump, or nipple discharge  Gastrointestinal: no complaints of abdominal pain, constipation, nausea, vomiting, or diarrhea or bloody stools  Genitourinary : no complaints of dysuria, incontinence, pelvic pain, no dysmenorrhea, vaginal discharge or abnormal vaginal bleeding and as noted in HPI.  Musculoskeletal: no complaints of arthralgia, no myalgia, no joint swelling or  "stiffness, no limb pain or swelling.  Integumentary: no complaints of skin rash or lesion, itching or dry skin  Neurological: no complaints of headache, no confusion, no numbness or tingling, no dizziness or fainting    Objective      /64   Ht 5' 7\" (1.702 m)   Wt 69.4 kg (152 lb 14.4 oz)   LMP 05/13/2024 (Exact Date)   BMI 23.95 kg/m²     General:   appears stated age, cooperative, alert normal mood and affect   Lungs: Unlabored breathing     Breasts: normal appearance, no masses or tenderness, Inspection negative, No nipple retraction or dimpling, No nipple discharge or bleeding, No axillary or supraclavicular adenopathy, Normal to palpation without dominant masses   Abdomen: soft, non-tender, without masses or organomegaly   Vulva: normal, normal female genitalia, Bartholin's, Urethra, [place] normal, no lesions, normal female hair distribution, no clitoral enlargement   Vagina: normal vagina, no discharge, exudate, lesion, or erythema   Urethra: normal   Cervix: Normal, no discharge. Nontender.   Uterus: normal size, contour, position, consistency, mobility, non-tender   Adnexa: no mass, fullness, tenderness   Psychiatric orientation to person, place, and time: normal. mood and affect: normal          "

## 2024-06-03 ENCOUNTER — TELEPHONE (OUTPATIENT)
Age: 24
End: 2024-06-03

## 2024-06-03 NOTE — TELEPHONE ENCOUNTER
Patient called about pap smear results and they did not post yet. She will call back on Wednesday to see if they posted. Thank you

## 2024-06-05 LAB
LAB AP GYN PRIMARY INTERPRETATION: NORMAL
Lab: NORMAL

## 2024-06-06 ENCOUNTER — TELEPHONE (OUTPATIENT)
Age: 24
End: 2024-06-06

## 2024-06-06 NOTE — TELEPHONE ENCOUNTER
----- Message from Destiny Gustafson MD sent at 6/5/2024  5:16 PM EDT -----  Please inform patient of normal results

## 2024-07-23 DIAGNOSIS — A60.04 HERPES SIMPLEX VULVOVAGINITIS: ICD-10-CM

## 2024-07-23 RX ORDER — VALACYCLOVIR HYDROCHLORIDE 500 MG/1
500 TABLET, FILM COATED ORAL DAILY
Qty: 90 TABLET | Refills: 3 | Status: SHIPPED | OUTPATIENT
Start: 2024-07-23 | End: 2025-07-18

## 2024-07-30 ENCOUNTER — TELEPHONE (OUTPATIENT)
Age: 24
End: 2024-07-30

## 2024-07-30 NOTE — TELEPHONE ENCOUNTER
Patient being billed for HPV Screening lab work for date of service 5/28/24, provided billing telephone number.

## 2024-08-28 ENCOUNTER — NURSE TRIAGE (OUTPATIENT)
Age: 24
End: 2024-08-28

## 2024-08-28 DIAGNOSIS — N89.8 VAGINAL ODOR: Primary | ICD-10-CM

## 2024-08-28 RX ORDER — METRONIDAZOLE 500 MG/1
500 TABLET ORAL 2 TIMES DAILY
Qty: 14 TABLET | Refills: 0 | Status: SHIPPED | OUTPATIENT
Start: 2024-08-28 | End: 2024-09-04

## 2024-08-28 NOTE — TELEPHONE ENCOUNTER
Regarding: vaginal discharge, odor, itching - bv concern  ----- Message from Rubi ALLEN sent at 8/28/2024 10:56 AM EDT -----  Established patient called reporting concern of BV. Pt has sxs of vaginal discharge, odor, itching; onset about 1 week. Pt prefers [facility] office. Attempted warm transfer.

## 2024-08-28 NOTE — TELEPHONE ENCOUNTER
"Pt returned call. States she believes she has BV. Reporting mild intermittent abdominal cramping, foul odor, increased clear/white discharge for the last week. Denies itching or pain but states there is discomfort. Pt states she usually takes oral flagyl for her BV infections, pharmacy in chart confirmed. Advised to avoid ETOH while on this medication. Also advised to call back if symptoms do not improve/worsen. She verbalized understanding and is thankful.    If patient has history of BV in prior two years, prescribe:    -Metrogel Intravaginally x 5 nights, with no refills.    If patient refuses the intravaginal medication and is requesting a PO medication, prescribe:    -Flagyl 500mg BID PO x 7 days, with no refills    Medication instructions:  Please instruct patient that they cannot drink alcohol while on Flagyl.    Please instruct patient that they should not have sex while on treatment or 3 days after if using Metrogel.    If patient is having recurrent BV infections, please schedule appointment (should be seen within 5 days).    Reason for Disposition   Bad smelling vaginal discharge    Answer Assessment - Initial Assessment Questions  1. DISCHARGE: \"Describe the discharge.\" (e.g., white, yellow, green, gray, foamy, cottage cheese-like)      Clear/white  2. ODOR: \"Is there a bad odor?\"      yes  3. ONSET: \"When did the discharge begin?\"      About one week ago  4. RASH: \"Is there a rash in that area?\" If Yes, ask: \"Describe it.\" (e.g., redness, blisters, sores, bumps)      denies  5. ABDOMINAL PAIN: \"Are you having any abdominal pain?\" If Yes, ask: \"What does it feel like? \" (e.g., crampy, dull, intermittent, constant)       Mild cramping  6. ABDOMINAL PAIN SEVERITY: If present, ask: \"How bad is it?\"  (e.g., mild, moderate, severe)   - MILD - doesn't interfere with normal activities    - MODERATE - interferes with normal activities or awakens from sleep    - SEVERE - patient doesn't want to move (R/O " "peritonitis)       mild  7. CAUSE: \"What do you think is causing the discharge?\" \"Have you had the same problem before? What happened then?\"      BV  8. OTHER SYMPTOMS: \"Do you have any other symptoms?\" (e.g., fever, itching, vaginal bleeding, pain with urination, injury to genital area, vaginal foreign body)      denies  9. PREGNANCY: \"Is there any chance you are pregnant?\" \"When was your last menstrual period?\"      LMP mid month. Denies chance of pregnancy    Protocols used: Vaginal Discharge-ADULT-[place]    "

## 2024-09-04 DIAGNOSIS — A60.04 HERPES SIMPLEX VULVOVAGINITIS: ICD-10-CM

## 2024-09-04 RX ORDER — NORGESTIMATE AND ETHINYL ESTRADIOL 0.25-0.035
1 KIT ORAL DAILY
Qty: 84 TABLET | Refills: 1 | Status: SHIPPED | OUTPATIENT
Start: 2024-09-04

## 2024-09-04 RX ORDER — VALACYCLOVIR HYDROCHLORIDE 500 MG/1
500 TABLET, FILM COATED ORAL DAILY
Qty: 90 TABLET | Refills: 1 | Status: SHIPPED | OUTPATIENT
Start: 2024-09-04 | End: 2025-08-30

## 2024-09-04 NOTE — TELEPHONE ENCOUNTER
Medication: norgestimate-ethinyl estradiol (Shirley) 0.25-35 MG-MCG per tablet     Dose/Frequency: Take 1 tablet by mouth daily     Quantity: 84    Pharmacy: [facility] #[id number] - [place] [address]      Office:   [] PCP/Provider -   [x] Speciality/Provider -     Does the patient have enough for 3 days?   [] Yes   [x] No - Send as HP to POD    -------------------------------------------------------    Medication: valACYclovir (VALTREX) 500 mg tablet     Dose/Frequency: Take 1 tablet (500 mg total) by mouth daily,     Quantity: 90    Pharmacy: [facility] [address]     Office:   [] PCP/Provider -   [x] Speciality/Provider -     Does the patient have enough for 3 days?   [x] Yes   [] No - Send as HP to POD

## 2024-10-29 ENCOUNTER — OFFICE VISIT (OUTPATIENT)
Dept: OBGYN CLINIC | Facility: MEDICAL CENTER | Age: 24
End: 2024-10-29
Payer: COMMERCIAL

## 2024-10-29 VITALS
SYSTOLIC BLOOD PRESSURE: 100 MMHG | HEIGHT: 67 IN | BODY MASS INDEX: 24.83 KG/M2 | DIASTOLIC BLOOD PRESSURE: 60 MMHG | WEIGHT: 158.2 LBS

## 2024-10-29 DIAGNOSIS — R39.9 UTI SYMPTOMS: Primary | ICD-10-CM

## 2024-10-29 PROCEDURE — 87086 URINE CULTURE/COLONY COUNT: CPT | Performed by: OBSTETRICS & GYNECOLOGY

## 2024-10-29 PROCEDURE — 99214 OFFICE O/P EST MOD 30 MIN: CPT | Performed by: OBSTETRICS & GYNECOLOGY

## 2024-10-29 RX ORDER — NITROFURANTOIN 25; 75 MG/1; MG/1
100 CAPSULE ORAL 2 TIMES DAILY
Qty: 14 CAPSULE | Refills: 0 | Status: SHIPPED | OUTPATIENT
Start: 2024-10-29

## 2024-10-29 RX ORDER — PHENAZOPYRIDINE HYDROCHLORIDE 200 MG/1
200 TABLET, FILM COATED ORAL
Qty: 10 TABLET | Refills: 0 | Status: SHIPPED | OUTPATIENT
Start: 2024-10-29

## 2024-10-29 NOTE — PROGRESS NOTES
"Ambulatory Visit  Name: Aiyana Manuel      : 2000      MRN: [id number]  Encounter Provider: Callie Gil MD  Encounter Date: 10/29/2024   Encounter department: [facility]    Assessment & Plan  UTI symptoms    Orders:    nitrofurantoin (MACROBID) 100 mg capsule; Take 1 capsule (100 mg total) by mouth 2 (two) times a day    phenazopyridine (PYRIDIUM) 200 mg tablet; Take 1 tablet (200 mg total) by mouth 3 (three) times a day with meals    Urine culture      History of Present Illness     Aiyana Manuel is a 23 y.o. female who presents with complaints of suprapubic discomfort. No hesitancy or frequency, some dysuria  No vaginal discharge, itching, burning, bleeding    History obtained from : patient  Review of Systems   Constitutional: Negative.    HENT: Negative.     Eyes: Negative.    Respiratory: Negative.     Cardiovascular: Negative.    Gastrointestinal: Negative.    Genitourinary:  Positive for dysuria.   Musculoskeletal: Negative.    All other systems reviewed and are negative.    Medical History Reviewed by provider this encounter:       Current Outpatient Medications on File Prior to Visit   Medication Sig Dispense Refill    norgestimate-ethinyl estradiol (Shirley) 0.25-35 MG-MCG per tablet Take 1 tablet by mouth daily 84 tablet 1    valACYclovir (VALTREX) 500 mg tablet Take 1 tablet (500 mg total) by mouth daily 90 tablet 1    mometasone (ELOCON) 0.1 % cream  (Patient not taking: Reported on 2024)       No current facility-administered medications on file prior to visit.      Social History     Tobacco Use    Smoking status: Never    Smokeless tobacco: Never   Vaping Use    Vaping status: Never Used   Substance and Sexual Activity    Alcohol use: Yes     Comment: social    Drug use: No    Sexual activity: Yes     Partners: Male     Birth control/protection: Pill         Objective     /60   Ht 5' 7\" (1.702 m)   Wt 71.8 kg (158 lb 3.2 oz)   LMP 10/03/2024 " (Exact Date)   BMI 24.78 kg/m²     Physical Exam  Vitals and nursing note reviewed.   Constitutional:       General: She is not in acute distress.     Appearance: She is well-developed.   HENT:      Head: Normocephalic and atraumatic.   Eyes:      Conjunctiva/sclera: Conjunctivae normal.   Cardiovascular:      Rate and Rhythm: Normal rate and regular rhythm.      Heart sounds: No murmur heard.  Pulmonary:      Effort: Pulmonary effort is normal. No respiratory distress.      Breath sounds: Normal breath sounds.   Abdominal:      Palpations: Abdomen is soft.      Tenderness: There is no abdominal tenderness.   Genitourinary:     General: Normal vulva.      Exam position: Lithotomy position.      Labia:         Right: No rash, tenderness or lesion.         Left: No rash, tenderness or lesion.    Musculoskeletal:         General: No swelling.      Cervical back: Neck supple.   Skin:     General: Skin is warm and dry.      Capillary Refill: Capillary refill takes less than 2 seconds.   Neurological:      Mental Status: She is alert.   Psychiatric:         Mood and Affect: Mood normal.

## 2024-10-30 ENCOUNTER — TELEPHONE (OUTPATIENT)
Age: 24
End: 2024-10-30

## 2024-10-30 NOTE — TELEPHONE ENCOUNTER
Outgoing call to patient. No answer, left message stating ok to start medications. Patient to call back with any follow-up questions.

## 2024-10-30 NOTE — TELEPHONE ENCOUNTER
Patient calling in stating that she was seen for a possible UTI yesterday, and pt stating she was prescribed macrobid yesterday as well as pyridium, pt stating that she's unsure if she should start taking the medications now, as pt stating that provider didn't say when to start the medications.

## 2024-10-30 NOTE — TELEPHONE ENCOUNTER
Patient called back unsure why she had 2 prescriptions.  She was advised Macrobid is the antibiotic and the other will help with symptoms.  Pt verbalized understanding, no further questions or concerns at this time.

## 2024-10-31 LAB — BACTERIA UR CULT: NORMAL

## 2024-11-04 ENCOUNTER — TELEPHONE (OUTPATIENT)
Age: 24
End: 2024-11-04

## 2024-11-04 NOTE — TELEPHONE ENCOUNTER
Patient called and asked if she should complete her UTI medication, directions are unclear. Please advise.     Patient was made aware of her results from her urine culture**

## 2024-11-12 ENCOUNTER — OFFICE VISIT (OUTPATIENT)
Dept: OBGYN CLINIC | Facility: CLINIC | Age: 24
End: 2024-11-12
Payer: COMMERCIAL

## 2024-11-12 VITALS
DIASTOLIC BLOOD PRESSURE: 68 MMHG | HEIGHT: 67 IN | BODY MASS INDEX: 24.61 KG/M2 | WEIGHT: 156.8 LBS | SYSTOLIC BLOOD PRESSURE: 100 MMHG

## 2024-11-12 DIAGNOSIS — R10.32 LEFT LOWER QUADRANT ABDOMINAL PAIN: Primary | ICD-10-CM

## 2024-11-12 PROCEDURE — 99213 OFFICE O/P EST LOW 20 MIN: CPT | Performed by: OBSTETRICS & GYNECOLOGY

## 2024-11-12 NOTE — PROGRESS NOTES
Assessment Diagnoses and all orders for this visit:    Left lower quadrant abdominal pain  -     US pelvis complete w transvaginal; Future         Plan  Patient had a urine culture 10/29/2024 which was negative.  Will order a pelvic ultrasound to rule out gynecologic causes.  Patient to follow-up with PCP if pelvic ultrasound is normal and symptoms persist.  Discussed possibility of a kidney stone.  Patient encouraged to increase hydration.  Patient in agreement with plan.    Subjective   Aiyana Manuel is a 23 y.o. female here for a problem visit.  Patient is complaining of suprapubic pain on her left side, which only occurs when she is urinating.  No issues with her menses.  Patient denies any changes in medications, diet or exercise.  Thinks that the pain might be improving.  No dysuria or hematuria.  No changes in the color of her urine.  Patient Active Problem List   Diagnosis    HSV-1 infection    Telogen effluvium       Gynecologic History  Patient's last menstrual period was 10/03/2024 (exact date).  The current method of family planning is OCP (estrogen/progesterone).    Past Medical History:   Diagnosis Date    Abnormal Pap smear of cervix     HPV (human papilloma virus) infection     Urogenital trichomoniasis      No past surgical history on file.  Family History   Problem Relation Age of Onset    No Known Problems Mother     No Known Problems Father     No Known Problems Sister     No Known Problems Brother     No Known Problems Maternal Grandmother     No Known Problems Maternal Grandfather     No Known Problems Paternal Grandmother     No Known Problems Paternal Grandfather     Ovarian cancer Family      Social History     Socioeconomic History    Marital status: Single     Spouse name: Not on file    Number of children: Not on file    Years of education: Not on file    Highest education level: Not on file   Occupational History    Not on file   Tobacco Use    Smoking status: Never    Smokeless tobacco:  Never   Vaping Use    Vaping status: Never Used   Substance and Sexual Activity    Alcohol use: Yes     Comment: social    Drug use: No    Sexual activity: Yes     Partners: Male     Birth control/protection: Pill   Other Topics Concern    Not on file   Social History Narrative    Not on file     Social Determinants of Health     Financial Resource Strain: Not on file   Food Insecurity: Not on file   Transportation Needs: Not on file   Physical Activity: Not on file   Stress: Not on file   Social Connections: Not on file   Intimate Partner Violence: Not on file   Housing Stability: Not on file     Allergies   Allergen Reactions    Cephalosporins Hives    Erythromycin Base Hives    Amoxicillin Hives and Rash    Erythromycin Rash    Sulfamethoxazole-Trimethoprim Rash       Current Outpatient Medications:     norgestimate-ethinyl estradiol (Shirley) 0.25-35 MG-MCG per tablet, Take 1 tablet by mouth daily, Disp: 84 tablet, Rfl: 1    valACYclovir (VALTREX) 500 mg tablet, Take 1 tablet (500 mg total) by mouth daily, Disp: 90 tablet, Rfl: 1    Review of Systems  Constitutional :no fever, feels well, no tiredness, no recent weight gain or loss  ENT: no ear ache, no loss of hearing, no nosebleeds or nasal discharge, no sore throat or hoarseness.  Cardiovascular: no complaints of slow or fast heart beat, no chest pain, no palpitations, no leg claudication or lower extremity edema.  Respiratory: no complaints of shortness of shortness of breath, no HADDAD  Breasts:no complaints of breast pain, breast lump, or nipple discharge  Gastrointestinal: no complaints of abdominal pain, constipation, nausea, vomiting, or diarrhea or bloody stools  Genitourinary : +complaints of dysuria, no incontinence, pelvic pain, no dysmenorrhea, vaginal discharge or abnormal vaginal bleeding and as noted in HPI.  Musculoskeletal: no complaints of arthralgia, no myalgia, no joint swelling or stiffness, no limb pain or swelling.  Integumentary: no  "complaints of skin rash or lesion, itching or dry skin  Neurological: no complaints of headache, no confusion, no numbness or tingling, no dizziness or fainting     Objective     /68   Ht 5' 7\" (1.702 m)   Wt 71.1 kg (156 lb 12.8 oz)   LMP 10/03/2024 (Exact Date)   BMI 24.56 kg/m²     General Appears stated age, cooperative, alert normal mood and affect   Psychiatric oriented to person, place and time.  Mood and affect normal      "

## 2024-11-19 ENCOUNTER — NURSE TRIAGE (OUTPATIENT)
Age: 24
End: 2024-11-19

## 2024-11-19 NOTE — TELEPHONE ENCOUNTER
"Patient calling in stating that she's on birth control Shirley, pt stating that she's missed 3 pills in a row and pt stating that she was taking 2 pills a day to \"catch up\" and now pt is having vaginal bleeding and cramping. Pt stating that she's having red bleeding when wiping. Pt denies cramping right now, pt had cramping earlier today.     Pt was notified of If you miss three pills in a row, wait for your period and then restart a new pack. Be sure to use backup birth control (condoms) for the next month.    Pt stating that she caught up on the birth control pills and is now taking one a day.   Pt would like to know how to proceed further, pt was notified that a message will be sent to the provider, and she will receive a call back once the provider gives further recommendations, Pt verbalized understanding, no further questions at this time        Reason for Disposition   Missed 2 or more active hormone pills (includes delayed start of next pill pack by 2 or more days)    Answer Assessment - Initial Assessment Questions  1. TYPE: \"What is the name of the birth control pill you are using?\"      Shirley   2. PACK TYPE: \"How do you take your birth control pill?\"      Pt stating taht she has a placebo week at the end of the pack   3. START DATE: \"When did you first start taking this birth control pill?\"      Pt stating years ago   4. SYMPTOM: \"What is the main symptom (or question) you're concerned about?\"      Vaginal bleeding and cramping   5. ONSET: \"When did the symptoms start?\"      Pt stating this started yesterday.   6. VAGINAL BLEEDING: \"Are you having any unusual vaginal bleeding?\"      Yes   7. ABDOMEN OR PELVIC PAIN: \"Are you have any pain in your abdomen or pelvic area?\" (Scale: 0, 1-10; none, mild, moderate, severe)      Abdominal cramping earlier 4/10   8. MISSED OR LATE PILLS: \"Did you miss or take any pills late?\" If Yes, ask: \"When? How many pills?\"  Note: Pill is considered missed if taken more than 24 " "hours later than scheduled time.      Pt stating that she missed 3 pills in a row   9. PREGNANCY: \"Are you concerned you might be pregnant?\" \"When was your last menstrual period?\"      Pt denies pregnancy, LMP 11/1/24.    Protocols used: Contraception - Birth Control Pills - Combined-Adult-[place]    "

## 2024-11-22 DIAGNOSIS — A60.04 HERPES SIMPLEX VULVOVAGINITIS: ICD-10-CM

## 2024-11-22 RX ORDER — NORGESTIMATE AND ETHINYL ESTRADIOL 0.25-0.035
1 KIT ORAL DAILY
Qty: 84 TABLET | Refills: 1 | Status: SHIPPED | OUTPATIENT
Start: 2024-11-22

## 2024-11-22 NOTE — TELEPHONE ENCOUNTER
Pt following up with a few more questions regarding starting up on OCP pack after missing 3 pills (see 11/19/24 Nurse Triage encounter). RN reinforced education. Pt did note she still has heavy bleeding however denies saturating a pad <1 hour, passing large clots or having severe cramping. Pt requesting a refill on OCP since she is out of packs/refills. RN advised can place a refill request. Pharmacy on file verified. No further questions.

## 2024-12-02 ENCOUNTER — NURSE TRIAGE (OUTPATIENT)
Age: 24
End: 2024-12-02

## 2024-12-02 NOTE — TELEPHONE ENCOUNTER
"Aiyana notes increased milky/yellow discharge with unpleasant odor. States not fishy.  Has had BV in past, this is different.   She notes symptoms began when she had missed OCP doses and had BTB. She stopped OCP completely, menses 11/19- bled x 6 days. Odor/discharge/pelvic achiness persist.  Recommended in meantime check HPT, wash with mild unscented soap such as dove or ivory, can use aquaphor or vitamin A/D ointment to protect vaginal tissues. Keep open to air at hs, change out of wet/sweaty clothes ASAP and avoid tight restrictive clothing.     Aiyana also requesting STD testing. Recently relationship break up.      Offered today in [place]- declined.  Accepted appt for 12/9 in [place]. C/B if develops green discharge, increased pelvic pain, fever or + HPT.     Reason for Disposition   Bad smelling vaginal discharge    Answer Assessment - Initial Assessment Questions  1. DISCHARGE: \"Describe the discharge.\" (e.g., white, yellow, green, gray, foamy, cottage cheese-like)      Milky white to yellow  2. ODOR: \"Is there a bad odor?\"      Strong unpleasant odor, not specifically fishy  3. ONSET: \"When did the discharge begin?\"      Around 11/25  4. RASH: \"Is there a rash in the genital area?\" If Yes, ask: \"Describe it.\" (e.g., redness, blisters, sores, bumps)      denies  5. ABDOMEN PAIN: \"Are you having any abdomen pain?\" If Yes, ask: \"What does it feel like? \" (e.g., crampy, dull, intermittent, constant)       Pelvic discomfort, achy- intermittently  6. ABDOMEN PAIN SEVERITY: If present, ask: \"How bad is it?\" (e.g., Scale 1-10; mild, moderate, or severe)      Mild to moderate   7. CAUSE: \"What do you think is causing the discharge?\" \"Have you had the same problem before?\" \"What happened then?\"      Unsure-has not had in past  8. OTHER SYMPTOMS: \"Do you have any other symptoms?\" (e.g., fever, itching, vaginal bleeding, pain with urination, injury to genital area, vaginal foreign body)      denies  9. PREGNANCY: \"Is " "there any chance you are pregnant?\" \"When was your last menstrual period?\"      Denies being sexually active    Protocols used: Vaginal Discharge-Adult-[place]    "

## 2024-12-02 NOTE — TELEPHONE ENCOUNTER
Regarding: odor, discharge, pain  ----- Message from Krystal GAMA sent at 12/2/2024  1:55 PM EST -----  Patient experiencing odor, heavy discharge and pain.

## 2024-12-09 ENCOUNTER — OFFICE VISIT (OUTPATIENT)
Dept: OBGYN CLINIC | Facility: CLINIC | Age: 24
End: 2024-12-09
Payer: COMMERCIAL

## 2024-12-09 VITALS
WEIGHT: 154.8 LBS | BODY MASS INDEX: 24.3 KG/M2 | HEIGHT: 67 IN | DIASTOLIC BLOOD PRESSURE: 68 MMHG | SYSTOLIC BLOOD PRESSURE: 100 MMHG

## 2024-12-09 DIAGNOSIS — Z72.51 HIGH RISK SEXUAL BEHAVIOR, UNSPECIFIED TYPE: ICD-10-CM

## 2024-12-09 DIAGNOSIS — Z11.3 SCREENING EXAMINATION FOR STI: Primary | ICD-10-CM

## 2024-12-09 DIAGNOSIS — N89.8 VAGINAL DISCHARGE: ICD-10-CM

## 2024-12-09 PROCEDURE — 87491 CHLMYD TRACH DNA AMP PROBE: CPT | Performed by: NURSE PRACTITIONER

## 2024-12-09 PROCEDURE — 99213 OFFICE O/P EST LOW 20 MIN: CPT | Performed by: NURSE PRACTITIONER

## 2024-12-09 PROCEDURE — 87480 CANDIDA DNA DIR PROBE: CPT | Performed by: NURSE PRACTITIONER

## 2024-12-09 PROCEDURE — 87591 N.GONORRHOEAE DNA AMP PROB: CPT | Performed by: NURSE PRACTITIONER

## 2024-12-09 PROCEDURE — 87660 TRICHOMONAS VAGIN DIR PROBE: CPT | Performed by: NURSE PRACTITIONER

## 2024-12-09 PROCEDURE — 87510 GARDNER VAG DNA DIR PROBE: CPT | Performed by: NURSE PRACTITIONER

## 2024-12-09 NOTE — PROGRESS NOTES
Name: Aiyana Manuel      : 2000      MRN: [id number]  Encounter Provider: ANT Valladares  Encounter Date: 2024   Encounter department: [facility]  :  Assessment & Plan  Screening examination for STI    Orders:    Chlamydia/GC amplified DNA by PCR    RPR-Syphilis Screening (Total Syphilis IGG/IGM); Future    HIV 1/2 AG/AB w Reflex [facility] for 2 yr old and above; Future    Hepatitis B surface antigen; Future    Hepatitis C antibody; Future    VAGINOSIS DNA PROBE    Vaginal discharge  Affirm collected. Will treat based on results    Orders:    Chlamydia/GC amplified DNA by PCR    RPR-Syphilis Screening (Total Syphilis IGG/IGM); Future    HIV 1/2 AG/AB w Reflex [facility] for 2 yr old and above; Future    Hepatitis B surface antigen; Future    Hepatitis C antibody; Future    VAGINOSIS DNA PROBE    High risk sexual behavior, unspecified type  Encouraged safe sexual practices     Orders:    Chlamydia/GC amplified DNA by PCR    RPR-Syphilis Screening (Total Syphilis IGG/IGM); Future    HIV 1/2 AG/AB w Reflex [facility] for 2 yr old and above; Future    Hepatitis B surface antigen; Future    Hepatitis C antibody; Future    VAGINOSIS DNA PROBE    Signs and symptoms to report reviewed  Will call/ [employer] message with results    RTO 2025 for annual exam or sooner as needed     History of Present Illness   HPI  Aiyana Manuel is a 24 y.o. female who presents here requesting STI testing with complaints of vaginal discharge, irritation and odor for about 3 weeks..  LMP 24 Current symptoms include intermittent vaginal discharge described as milky/yellow with an unpleasant odor.  Associated symptoms include odor.  Denies alleviating or aggravating factors.  Has not tried any OTC remedies. Is sexually active using OCPs for contraception.  Denies new partner, fever, chills, pelvic pain, bowel or bladder concerns.  Is requesting STI testing today.    Last Pap smear 24 NILM/ HR  "HPV(-)   Completed Gardasil vaccine series    History obtained from: patient    Review of Systems   Constitutional:  Negative for chills and fever.   Respiratory: Negative.     Cardiovascular: Negative.    Genitourinary:  Positive for vaginal discharge. Negative for decreased urine volume, difficulty urinating, dyspareunia, dysuria, enuresis, flank pain, frequency, genital sores, hematuria, menstrual problem, pelvic pain, urgency, vaginal bleeding and vaginal pain.     Medical History Reviewed by provider this encounter:  Meds  Problems     .     Objective   /68   Ht 5' 7\" (1.702 m)   Wt 70.2 kg (154 lb 12.8 oz)   LMP 11/01/2024   BMI 24.25 kg/m²      Physical Exam  Vitals reviewed. Exam conducted with a chaperone present.   Constitutional:       Appearance: Normal appearance.   Genitourinary:     General: Normal vulva.      Exam position: Lithotomy position.      Labia:         Right: No rash, tenderness, lesion or injury.         Left: No rash, tenderness, lesion or injury.       Vagina: Vaginal discharge present.      Comments: Small to moderate amount of thin gray vaginal discharge noted on exam  Neurological:      Mental Status: She is alert and oriented to person, place, and time.   Psychiatric:         Mood and Affect: Mood normal.         Behavior: Behavior normal.         "

## 2024-12-10 ENCOUNTER — RESULTS FOLLOW-UP (OUTPATIENT)
Dept: OBGYN CLINIC | Facility: CLINIC | Age: 24
End: 2024-12-10

## 2024-12-10 DIAGNOSIS — B96.89 BACTERIAL VAGINOSIS: Primary | ICD-10-CM

## 2024-12-10 DIAGNOSIS — N76.0 BACTERIAL VAGINOSIS: Primary | ICD-10-CM

## 2024-12-10 LAB
C TRACH DNA SPEC QL NAA+PROBE: NEGATIVE
CANDIDA RRNA VAG QL PROBE: NOT DETECTED
G VAGINALIS RRNA GENITAL QL PROBE: DETECTED
N GONORRHOEA DNA SPEC QL NAA+PROBE: NEGATIVE
T VAGINALIS RRNA GENITAL QL PROBE: NOT DETECTED

## 2024-12-10 RX ORDER — METRONIDAZOLE 7.5 MG/G
1 GEL VAGINAL
Qty: 70 G | Refills: 0 | Status: SHIPPED | OUTPATIENT
Start: 2024-12-10 | End: 2024-12-15

## 2024-12-19 ENCOUNTER — NURSE TRIAGE (OUTPATIENT)
Age: 24
End: 2024-12-19

## 2024-12-19 NOTE — TELEPHONE ENCOUNTER
"Called patient and reviewed provider recommendations. Patient reports lump under left armpit that has been present for one month. States lump was painful at first but pain has since resolved. Denies any other symptoms.   Unable to find appointment within 3 days, per protocol. Attempted to contact clerical, however no response. Scheduled patient for next available appt 12/27 with Dr. Gan. Patient aware to call back with worsening symptoms.    Routing to clerical in case sooner appointment is needed or available. Also routing to provider as fyi.     Reason for Disposition   Breast lump    Answer Assessment - Initial Assessment Questions  1. SYMPTOM: \"What's the main symptom you're concerned about?\"  (e.g., lump, nipple discharge, pain, rash )      Lump  2. LOCATION: \"Where is the lump located?\"      Right armpit  3. ONSET: \"When did s/s  start?\"      About one month ago  5. CAUSE: \"What do you think is causing this symptom?\"      Unsure  6. OTHER SYMPTOMS: \"Do you have any other symptoms?\" (e.g., fever, breast pain, nipple discharge, redness or rash)      Denies nipple discharge, redness, breast lump, fevers/chills. States lump was painful at first but pain resolved.  7. PREGNANCY-BREASTFEEDING: \"Is there any chance you are pregnant?\" \"When was your last menstrual period?\" \"Are you breastfeeding?\"      LMP - some point in November. Patient states she stopped birth control and was informed to allow body to have menses prior to continuing. Patient states has not gotten menses yet and asking if normal. Advised body is likely undergoing hormonal fluctuations and can take up to 3 months to regulate. Recommended patient continue monitoring and call back if no cycle in next couple of months.  Patient denies chance for pregnancy. Advised if any chance for pregnancy, should take home urine pregnancy test. Patient verbalized understanding.    Protocols used: Breast Symptoms-Adult-[place]    "

## 2024-12-19 NOTE — TELEPHONE ENCOUNTER
Pt called in about lab test: Chlamydia/GC amplified DNA. Confirms on 12/10 was advised about BV results.     Can provider review Chlamydia/GC amplified DNA results and provide response to pt?

## 2024-12-30 ENCOUNTER — TELEPHONE (OUTPATIENT)
Age: 24
End: 2024-12-30

## 2024-12-30 NOTE — TELEPHONE ENCOUNTER
[employer] & sent [employer] message making the patient aware Dr. Gil currently has an appointment on 1/8 at 3:30 in the [place] office, please call [phone] if you would like to schedule this appointment.

## 2024-12-30 NOTE — TELEPHONE ENCOUNTER
Patient returning call to see if appt for 01/08/2025 available. Warm transfer to [facility] office ,was informed 01/08/2025 no longer available, but there was a 4:15pm for today 12/30/2024, was placed on hold for greater then 2 minutes, disconnected and called back. Spoke with Laney who reached out to  Medical Assistant who states patient should call her PCP since lump is in armpit, after confirm with my supervisor, Fiif , Patient was told to contact her PCP regarding lump in armt. Patient had no further questions.

## 2024-12-30 NOTE — TELEPHONE ENCOUNTER
Patient called in and wanted to accept appointment but it is not showing up for me. Tried to warm transfer to office. Patient would like to know by end of day.

## 2025-01-28 ENCOUNTER — HOSPITAL ENCOUNTER (OUTPATIENT)
Dept: ULTRASOUND IMAGING | Facility: CLINIC | Age: 25
Discharge: HOME | End: 2025-01-28
Payer: COMMERCIAL

## 2025-01-28 VITALS — WEIGHT: 154 LBS | BODY MASS INDEX: 24.17 KG/M2 | HEIGHT: 67 IN

## 2025-01-28 DIAGNOSIS — R22.31 AXILLARY LUMP, RIGHT: ICD-10-CM

## 2025-01-28 PROCEDURE — 76642 ULTRASOUND BREAST LIMITED: CPT

## 2025-01-29 ENCOUNTER — RESULTS FOLLOW-UP (OUTPATIENT)
Dept: OBGYN CLINIC | Facility: MEDICAL CENTER | Age: 25
End: 2025-01-29

## 2025-01-29 NOTE — TELEPHONE ENCOUNTER
Spoke with patient regarding provider result note. Pt verbalized understanding, no further questions or concerns at this time.

## 2025-01-29 NOTE — RESULT ENCOUNTER NOTE
Please notify patient of benign results of breast ultrasound.  Area consistent with a benign cyst. Consent: The patient's consent was obtained including but not limited to risks of crusting, scabbing, blistering, scarring, darker or lighter pigmentary change, recurrence, incomplete removal and infection. Show Aperture Variable?: Yes Render Post-Care Instructions In Note?: no Detail Level: Detailed Aperture Size (Optional): C Application Tool (Optional): Cry-AC Duration Of Freeze Thaw-Cycle (Seconds): 3 Post-Care Instructions: I reviewed with the patient in detail post-care instructions. Patient is to wear sunprotection, and avoid picking at any of the treated lesions. Pt may apply Vaseline to crusted or scabbing areas. Number Of Freeze-Thaw Cycles: 1 freeze-thaw cycle Spray Paint Text: The liquid nitrogen was applied to the skin utilizing a spray paint frosting technique. Medical Necessity Information: It is in your best interest to select a reason for this procedure from the list below. All of these items fulfill various CMS LCD requirements except the new and changing color options. Medical Necessity Clause: This procedure was medically necessary because the lesions that were treated were:

## 2025-03-02 DIAGNOSIS — A60.04 HERPES SIMPLEX VULVOVAGINITIS: ICD-10-CM

## 2025-03-03 RX ORDER — VALACYCLOVIR HYDROCHLORIDE 500 MG/1
500 TABLET, FILM COATED ORAL DAILY
Qty: 90 TABLET | Refills: 1 | Status: SHIPPED | OUTPATIENT
Start: 2025-03-03 | End: 2026-02-26

## 2025-03-10 NOTE — PROGRESS NOTES
Assessment:  20 y o  Tenisha Tranuphin who presents as a follow up from ER on 1/27/2020 where she was diagnosed with genital herpes  Plan:  Diagnoses and all orders for this visit:    Herpes simplex vulvovaginitis  -     valACYclovir (VALTREX) 500 mg tablet; Take 1 tablet (500 mg total) by mouth daily  - Negative GC result reviewed  Advised repeat screening in 3mo  - HIV, RPR, Hep B&C labs ordered  - Record release signed for [facility] and [facility]    Current mild episode of major depressive disorder, unspecified whether recurrent ([facility] [address] )  -     Ambulatory referral to [facility]; Future  -     sertraline (ZOLOFT) 25 mg tablet; Take 1 tablet (25 mg total) by mouth daily  - If does not establish with PCP, should return in 2-3mo for reassessment      Total time of 40min was spent with the patient, >50% of which was spent on patient counseling      __________________________________________________________________    Subjective   Jared Penn is a 20 y o  Tenisha David who presents as a follow up from the ER on 1/27/2020 where she was diagnosed with genital herpes  She presents today with her mother and they have additional questions and request for screening  Patient reports her lesions and discomfort have entirely resolved  She reports shes feeling well, but having difficulties with emotionally dealing with this diagnosis  She notes she sees a therapist regularly anyway, but has never had a formal diagnosis of depression or anxiety to her knowledge  She now endorses dysphoric mood, difficulties sleeping, and intrusive thoughts pertaining to this situation  She notes she has thoughts of self-harm, but does not act on these feelings  She denies SI/HI  She struggles with how to move forward and to inform sexual partners in the future  Encouraged to continue with therapy  Discussed medication options, which she is agreeable to  Patient and her mother had many questions regarding outbreaks and how often they occur   Unfortunately, given that this is her first we do not know this yet  Alysa Fernandez would prefer to opt for suppression therapy  Reviewed that its reasonable to consider, but not strongly recommended unless she has frequent outbreaks  She understands this, but prefers to be proactive  She requests other STD screening; advised lab slips were ordered at our last contact and need to be collected  She will go to the lab today  She requests copies of these results and of [facility] records  I provided her with a record release for [facility] and info on how to request these  She completed a record release from [facility] today in our office and will request results when completed  Patient and her mother had all questions answered to their satisfaction  The following portions of the patient's history were reviewed and updated as appropriate: allergies, current medications, past medical history, past social history, past surgical history and problem list     Review of Systems   Gastrointestinal: Negative for abdominal distention, abdominal pain, constipation, diarrhea and nausea  Genitourinary: Negative for dysuria, genital sores, pelvic pain, vaginal bleeding, vaginal discharge and vaginal pain  Psychiatric/Behavioral: Positive for decreased concentration, dysphoric mood, self-injury (feelings of, not acted on) and sleep disturbance  Negative for agitation, hallucinations and suicidal ideas  The patient is nervous/anxious  The patient is not hyperactive  Objective  /70   Ht 5' 6" (1 676 m)   Wt 82 6 kg (182 lb)   LMP 02/07/2020   BMI 29 38 kg/m²      Physical Exam:  Physical Exam   Constitutional: She is oriented to person, place, and time  She appears well-developed and well-nourished  No distress  HENT:   Head: Normocephalic and atraumatic  Eyes: No scleral icterus  Cardiovascular: Normal rate  Pulmonary/Chest: Effort normal  No accessory muscle usage  No respiratory distress  Abdominal: Soft  She exhibits no distension  There is no tenderness  There is no rebound and no guarding  Musculoskeletal: She exhibits no edema or tenderness  Neurological: She is alert and oriented to person, place, and time  Skin: Skin is warm and dry  No rash noted  No erythema  Psychiatric: She has a normal mood and affect   Her behavior is normal  No

## 2025-05-23 ENCOUNTER — NURSE TRIAGE (OUTPATIENT)
Age: 25
End: 2025-05-23

## 2025-05-23 DIAGNOSIS — N89.8 VAGINAL ODOR: Primary | ICD-10-CM

## 2025-05-23 RX ORDER — METRONIDAZOLE 500 MG/1
500 TABLET ORAL 2 TIMES DAILY
Qty: 14 TABLET | Refills: 0 | Status: SHIPPED | OUTPATIENT
Start: 2025-05-23 | End: 2025-05-30

## 2025-06-01 DIAGNOSIS — A60.04 HERPES SIMPLEX VULVOVAGINITIS: ICD-10-CM

## 2025-06-02 RX ORDER — NORGESTIMATE AND ETHINYL ESTRADIOL 0.25-0.035
1 KIT ORAL DAILY
Qty: 84 TABLET | Refills: 0 | Status: SHIPPED | OUTPATIENT
Start: 2025-06-02

## 2025-06-03 ENCOUNTER — TELEPHONE (OUTPATIENT)
Age: 25
End: 2025-06-03

## 2025-06-03 NOTE — TELEPHONE ENCOUNTER
Patient called and has a vaginal rash and anal rash and she said it might be eczema but not sure and wanted to be seen by gyn.  I do not see any openings until 6/18 and she is trying to be seen tomorrow later in the day at [place] or [place] location . She said she would go to [place] or [place] also.  Please call her back at [phone] . Thank you